# Patient Record
Sex: FEMALE | Race: WHITE | NOT HISPANIC OR LATINO | Employment: OTHER | URBAN - METROPOLITAN AREA
[De-identification: names, ages, dates, MRNs, and addresses within clinical notes are randomized per-mention and may not be internally consistent; named-entity substitution may affect disease eponyms.]

---

## 2017-01-04 ENCOUNTER — OFFICE VISIT (OUTPATIENT)
Dept: OPHTHALMOLOGY | Facility: CLINIC | Age: 72
End: 2017-01-04
Attending: OPHTHALMOLOGY
Payer: MEDICARE

## 2017-01-04 DIAGNOSIS — H50.05 ALTERNATING ESOTROPIA: Primary | ICD-10-CM

## 2017-01-04 DIAGNOSIS — H50.21 HYPERTROPIA OF RIGHT EYE: ICD-10-CM

## 2017-01-04 PROCEDURE — 99213 OFFICE O/P EST LOW 20 MIN: CPT | Mod: ZF

## 2017-01-04 ASSESSMENT — VISUAL ACUITY
OD_CC: 20/20
CORRECTION_TYPE: GLASSES
OS_CC: 20/25
OS_CC+: -1
METHOD: SNELLEN - LINEAR

## 2017-01-04 ASSESSMENT — TONOMETRY
OS_IOP_MMHG: 14
IOP_METHOD: APPLANATION
OD_IOP_MMHG: 12

## 2017-01-04 ASSESSMENT — CONF VISUAL FIELD
OD_NORMAL: 1
OS_NORMAL: 1
METHOD: COUNTING FINGERS

## 2017-01-04 ASSESSMENT — REFRACTION_WEARINGRX
OS_SPHERE: -0.75
OD_AXIS: 176
OD_ADD: +2.25
OD_CYLINDER: +1.00
OS_AXIS: 005
OS_CYLINDER: +0.75
SPECS_TYPE: PAL
OD_SPHERE: -1.75
OS_ADD: +2.25

## 2017-01-04 ASSESSMENT — SLIT LAMP EXAM - LIDS
COMMENTS: NORMAL
COMMENTS: NORMAL

## 2017-01-04 NOTE — NURSING NOTE
Chief Complaints and History of Present Illnesses   Patient presents with     Post Op (Ophthalmology) Both Eyes     POW#12 s/p Left medial rectus recession of 4.5 mm on fixed suture and Right medial rectus recession of 4.5 mm on adjustable suture     HPI    Affected eye(s):  Both   Symptoms:     Blurred vision (Comment: feels overall vision is good, some blurriness once in a while)   Difficulty with driving (Comment: patient notes that she feels when she is pulling into her driveway, she feels that she veers to the left )   No double vision   No floaters   No flashes   No tearing   No burning            Comments:  Patient notes she has a HA right now, feels it is a sinus infection    Patient denies diplopia since last visit    Marlee Alegria January 4, 2017 12:55 PM

## 2017-01-04 NOTE — PROGRESS NOTES
ASSESSMENT AND PLAN:         1. Esotropia status post strabismus surgery:    - For a more thorough description of the disease presentation, please see my letter from the patient's initial visit with me.    - On 10/10/16 Mrs. Calle underwent bilateral medial rectus recession with use of adjustable sutures.    - Today she is happy about the surgery with no double vision or new concerns.    - Examination today showed a small angle exophoria with good control.  - Overall Mrs. Calle has done exceptionally well following strabismus surgery    - She will continue to follow with Dr. Carvajal for her macular degeneration.      I did not make a follow-up appointment, but I would be happy to see the patient back in the future should any new neuro-ophthalmic or strabismus concern arise.         Complete documentation of historical and exam elements from today's encounter can be found in the full encounter summary report (not reduplicated in this progress note).  I personally obtained the chief complaint(s) and history of present illness.  I confirmed and edited as necessary the review of systems, past medical/surgical history, family history, social history, and examination findings as documented by others; and I examined the patient myself.  I personally reviewed the relevant tests, images, and reports as documented above.  I formulated and edited as necessary the assessment and plan and discussed the findings and management plan with the patient and family   Rubio Martinez MD  1:33 PM  1/4/2017

## 2017-06-21 ENCOUNTER — OFFICE VISIT (OUTPATIENT)
Dept: OPHTHALMOLOGY | Facility: CLINIC | Age: 72
End: 2017-06-21
Attending: OPHTHALMOLOGY
Payer: MEDICARE

## 2017-06-21 DIAGNOSIS — H35.3130 NONEXUDATIVE AGE-RELATED MACULAR DEGENERATION, BILATERAL, STAGE UNSPECIFIED: Primary | ICD-10-CM

## 2017-06-21 DIAGNOSIS — H25.12 SENILE NUCLEAR SCLEROSIS, LEFT: ICD-10-CM

## 2017-06-21 DIAGNOSIS — Z96.1 PSEUDOPHAKIA: ICD-10-CM

## 2017-06-21 DIAGNOSIS — H50.05 ALTERNATING ESOTROPIA: ICD-10-CM

## 2017-06-21 PROCEDURE — 99213 OFFICE O/P EST LOW 20 MIN: CPT | Mod: 25,ZF

## 2017-06-21 PROCEDURE — 92134 CPTRZ OPH DX IMG PST SGM RTA: CPT | Mod: ZF | Performed by: OPHTHALMOLOGY

## 2017-06-21 ASSESSMENT — VISUAL ACUITY
OS_CC+: -2
OD_CC+: +2
OD_CC: 20/25
CORRECTION_TYPE: GLASSES
OS_CC: 20/25
METHOD: SNELLEN - LINEAR

## 2017-06-21 ASSESSMENT — REFRACTION_WEARINGRX
OS_SPHERE: -0.75
OD_CYLINDER: +1.00
OD_AXIS: 176
OD_SPHERE: -1.75
SPECS_TYPE: PAL
OS_CYLINDER: +0.75
OD_ADD: +2.25
OS_ADD: +2.25
OS_AXIS: 005

## 2017-06-21 ASSESSMENT — SLIT LAMP EXAM - LIDS: COMMENTS: MGD

## 2017-06-21 ASSESSMENT — TONOMETRY
IOP_METHOD: TONOPEN
OS_IOP_MMHG: 14
OD_IOP_MMHG: 14

## 2017-06-21 ASSESSMENT — CONF VISUAL FIELD
OD_NORMAL: 1
OS_NORMAL: 1

## 2017-06-21 ASSESSMENT — CUP TO DISC RATIO
OS_RATIO: 0.5
OD_RATIO: 0.4

## 2017-06-21 NOTE — PROGRESS NOTES
I have confirmed the patient's and reviewed Past Medical History, Past Surgical History, Social History, Family History, Problem List, Medication List and agree with Tech note.    CC: blurry vision    HPI:    72 year old female here for f/u dry macular degeneration.  S/p strabismus surgery with Dr. Martinez, happy  Vision seems stable since last visit here, maybe slightly worse since her strab surgery, but diplopia resolved    POHx:  CE/IOL OD 11/2014  S/p YAG OD     Amsler Grid:  No metamorphopsia OD or OS      Assessment and Plan    1. Dry Macular Degeneration, OU   - OCT today- many small - intermediate drusen both eyes, no intraretinal or SRF, however pseudo-drusen both eyes noted. Stable OU   - FAF 12/19/16:  Regular right eye and hyper-autofluorescence OS and pseudodrusen ou noted    - AREDS Category 1/2    - not taking AREDs currently     - Recommend Amsler monitoring    2. Pseudophakia, OD   - S/p YAG, looks good    3. Nuclear Sclerosis, OS   - Observe for now and will refer back to Dr. Acevedo when visually significant.    4. History of XT s/p strabismus repair with Dr. Martinez    - doing well    Retina 6 months with OCT and FAF OU     Jon Valentin MD MPH   Ophthalmology Resident PGY-3    ATTESTATION:  I have seen and examined the patient with Dr. Valentin and agree with the findings in this note, as well as the interpretations of the diagnostic tests.    Jocelyn Hayden MD PhD.  Professor & Chair

## 2017-06-21 NOTE — NURSING NOTE
Chief Complaints and History of Present Illnesses   Patient presents with     Follow Up For     Dry Macular Degeneration, OU     HPI    Affected eye(s):  Both   Symptoms:     No blurred vision   No decreased vision   No double vision   No floaters   No flashes         Do you have eye pain now?:  No      Comments:  Six month follow up for Dry Macular Degeneration, OU.  The patient notes that she is not having diplopia since her strabismus surgery.  She notes her visual acuity is stable since her last visit.  JACQUELIN Carver 7:21 AM 06/21/2017

## 2017-06-21 NOTE — MR AVS SNAPSHOT
After Visit Summary   6/21/2017    Jena Calle    MRN: [id number]           Patient Information     Date Of Birth          1945        Visit Information        Provider Department      6/21/2017 7:30 AM [facility]oc[facility] Alex [facility]        Today's Diagnoses     Nonexudative age-related macular degeneration, bilateral, stage unspecified    -  1    Pseudophakia - Right Eye        Senile nuclear sclerosis, left        Alternating esotropia - Both Eyes           Follow-ups after your visit        Follow-up notes from your care team     Return in about 6 months (around 12/21/2017) for OCT mac OU, FAF, AMD.      Future tests that were ordered for you today     Open Future Orders        Priority Expected Expires Ordered    OCT Retina Spectralis OU (both eyes) Routine  12/23/2017 6/21/2017    Fundus Autofluorescence Image (FAF) OU (both eyes) Routine  12/23/2017 6/21/2017            Who to contact     Please call your clinic at [phone] to:    Ask questions about your health    Make or cancel appointments    Discuss your medicines    Learn about your test results    Speak to your doctor   If you have compliments or concerns about an experience at your clinic, or if you wish to file a complaint, please contact [facility] Patient Relations at [phone] or email us at [email or web address]         Additional Information About Your Visit        MyChart Information     [employer] gives you secure access to your electronic health record. If you see a primary care provider, you can also send messages to your care team and make appointments. If you have questions, please call your primary care clinic.  If you do not have a primary care provider, please call [phone] and they will assist you.      [employer] is an electronic gateway that provides easy, online access to your medical records. With [employer], you can request a clinic appointment, read  your test results, renew a prescription or communicate with your care team.     To access your existing account, please contact your [facility] or call [phone] for assistance.        Care EveryWhere ID     This is your Care EveryWhere ID. This could be used by other organizations to access your [place] medical records  [id number]        Your Vitals Were     Last Period                   (LMP Unknown)            Blood Pressure from Last 3 Encounters:   10/10/16 128/64    Weight from Last 3 Encounters:   10/10/16 91.6 kg (202 lb)              We Performed the Following     OCT Retina Spectralis OU (both eyes)          Today's Medication Changes          These changes are accurate as of: 6/21/17  8:10 AM.  If you have any questions, ask your nurse or doctor.               Stop taking these medicines if you haven't already. Please contact your care team if you have questions.     ibuprofen 600 MG tablet   Commonly known as:  ADVIL/MOTRIN   Stopped by:  Jocelyn Alex MD                    Primary Care Provider Office Phone # Fax     Jeannette BOYD Courtneyguicho [phone]       [place] [id number]        Equal Access to Services     [facility]: Hadii eliza mohan Soderrick, waaxda lukimberlyadaha, qaybta kaalmada dago, michael howard . So [facility] [phone].    ATENCIÓN: Si habla español, tiene a solis disposición servicios gratuitos de asistencia lingüística. Llame al [phone].    We comply with applicable federal civil rights laws and [place] laws. We do not discriminate on the basis of race, color, national origin, age, disability sex, sexual orientation or gender identity.            Thank you!     Thank you for choosing EYE CLINIC  for your care. Our goal is always to provide you with excellent care. Hearing back from our patients is one way we can continue to improve our services.  Please take a few minutes to complete the written survey that you may receive in the mail after your visit with us. Thank you!             Your Updated Medication List - Protect others around you: Learn how to safely use, store and throw away your medicines at www.disposemymeds.org.          This list is accurate as of: 6/21/17  8:10 AM.  Always use your most recent med list.                   Brand Name Dispense Instructions for use Diagnosis    amphetamine-dextroamphetamine 20 MG per 24 hr capsule    ADDERALL XR     Take 20 mg by mouth daily        ascorbic acid 500 MG Cpcr CR capsule    vitamin C     Take 500 mg by mouth daily        aspirin 81 MG tablet      Take by mouth daily        Cholecalciferol 66672 UNITS Caps      Take by mouth 2 times daily        diltiazem 240 MG 24 hr capsule      Take by mouth every evening        FLUoxetine 40 MG capsule    PROzac     Take 80 mg by mouth every morning        FORTEO SC           lamoTRIgine 200 MG tablet    LaMICtal     Take 200 mg by mouth daily Takes 200 mg in am and `100mg in pm        levothyroxine 125 MCG tablet    SYNTHROID/LEVOTHROID     Take by mouth daily        mirtazapine 30 MG tablet    REMERON     Take 30 mg by mouth At Bedtime        MULTIVITAMINS PO           NEW MED      Inject 600 mcg as directed daily Foreto        pantoprazole 40 MG EC tablet    PROTONIX     Take by mouth every morning        prednisoLONE acetate 1 % ophthalmic susp    PRED FORTE    10 mL    Place 1 drop into both eyes 4 times daily    Postoperative eye state       ranitidine 300 MG tablet    ZANTAC     Take by mouth At Bedtime        TYLENOL PO      Take by mouth daily

## 2018-03-12 ENCOUNTER — OFFICE VISIT (OUTPATIENT)
Dept: OPHTHALMOLOGY | Facility: CLINIC | Age: 73
End: 2018-03-12
Attending: OPHTHALMOLOGY
Payer: MEDICARE

## 2018-03-12 DIAGNOSIS — H25.12 SENILE NUCLEAR SCLEROSIS, LEFT: ICD-10-CM

## 2018-03-12 DIAGNOSIS — H35.3130 NONEXUDATIVE AGE-RELATED MACULAR DEGENERATION, BILATERAL, STAGE UNSPECIFIED: Primary | ICD-10-CM

## 2018-03-12 DIAGNOSIS — Z96.1 PSEUDOPHAKIA: ICD-10-CM

## 2018-03-12 PROCEDURE — 92250 FUNDUS PHOTOGRAPHY W/I&R: CPT | Mod: ZF | Performed by: OPHTHALMOLOGY

## 2018-03-12 PROCEDURE — 92134 CPTRZ OPH DX IMG PST SGM RTA: CPT | Mod: ZF | Performed by: OPHTHALMOLOGY

## 2018-03-12 PROCEDURE — G0463 HOSPITAL OUTPT CLINIC VISIT: HCPCS | Mod: ZF

## 2018-03-12 ASSESSMENT — VISUAL ACUITY
CORRECTION_TYPE: GLASSES
OD_CC: 20/25
OD_CC: J1
OS_CC: 20/30
OS_CC: J2
OS_CC+: -1/+1
METHOD: SNELLEN - LINEAR

## 2018-03-12 ASSESSMENT — TONOMETRY
OD_IOP_MMHG: 13
IOP_METHOD: TONOPEN
OS_IOP_MMHG: 15

## 2018-03-12 ASSESSMENT — SLIT LAMP EXAM - LIDS: COMMENTS: MGD

## 2018-03-12 ASSESSMENT — CUP TO DISC RATIO
OS_RATIO: 0.5
OD_RATIO: 0.4

## 2018-03-12 ASSESSMENT — CONF VISUAL FIELD
OS_NORMAL: 1
OD_NORMAL: 1

## 2018-03-12 NOTE — NURSING NOTE
Chief Complaints and History of Present Illnesses   Patient presents with     Macular Degeneration Follow Up     HPI    Affected eye(s):  Both   Symptoms:     Decreased vision   No floaters   No flashes   Itching      Duration:  9 months   Frequency:  Constant       Do you have eye pain now?:  No      Comments:    Chief Complaints and History of Present Illnesses   Patient presents with     Macular Degeneration Follow Up     HPI    Affected eye(s):  Both   Symptoms:     Decreased vision   No floaters   No flashes   Itching      Duration:  9 months   Frequency:  Constant       Do you have eye pain now?:  No      Comments:  Pt thinks overall VA has diminished since last visit.  Pt has been unable to make the 6 month appointment due to family medical issues.  Has not been monitoring with [employer].  Noting problems reading price tags and ingredients on food labels.    Ava Hayes COT 1:36 PM 03/12/2018

## 2018-03-12 NOTE — MR AVS SNAPSHOT
After Visit Summary   3/12/2018    Jena Calle    MRN: [id number]           Patient Information     Date Of Birth          1945        Visit Information        Provider Department      3/12/2018 1:15 PM Jocelyn Alex [facility]        Today's Diagnoses     Nonexudative age-related macular degeneration, bilateral, stage unspecified    -  1    Pseudophakia - Right Eye        Senile nuclear sclerosis, left           Follow-ups after your visit        Follow-up notes from your care team     Return in about 4 months (around 7/12/2018) for Refraction, AMD, Cataract, Exam & OCT OU.      Who to contact     Please call your clinic at [phone] to:    Ask questions about your health    Make or cancel appointments    Discuss your medicines    Learn about your test results    Speak to your doctor            Additional Information About Your Visit        MyChart Information     [employer] gives you secure access to your electronic health record. If you see a primary care provider, you can also send messages to your care team and make appointments. If you have questions, please call your primary care clinic.  If you do not have a primary care provider, please call [phone] and they will assist you.      [employer] is an electronic gateway that provides easy, online access to your medical records. With [employer], you can request a clinic appointment, read your test results, renew a prescription or communicate with your care team.     To access your existing account, please contact your [facility] or call [phone] for assistance.        Care EveryWhere ID     This is your Care EveryWhere ID. This could be used by other organizations to access your [place] medical records  [id number]        Your Vitals Were     Last Period                   (LMP Unknown)            Blood Pressure from Last 3 Encounters:   10/10/16 128/64    Weight from Last 3  Encounters:   10/10/16 91.6 kg (202 lb)              We Performed the Following     Fundus Autofluorescence Image (FAF) OU (both eyes)     OCT Retina Spectralis OU (both eyes)        Primary Care Provider Office Phone # Fax #    Jeannette Galan [phone]       [facility] AND [address] [address]        Equal Access to Services     [facility] : Hadii aad ku hadasho Soomaali, waaxda luqadaha, qaybta kaalmada adeegyada, waxay idiin hayaan adeeg kharash la'aan ah. So [facility] [phone].    ATENCIÓN: Si habla español, tiene a solis disposición servicios gratuitos de asistencia lingüística. Rosaame al [phone].    We comply with applicable federal civil rights laws and [place] laws. We do not discriminate on the basis of race, color, national origin, age, disability, sex, sexual orientation, or gender identity.            Thank you!     Thank you for choosing EYE CLINIC  for your care. Our goal is always to provide you with excellent care. Hearing back from our patients is one way we can continue to improve our services. Please take a few minutes to complete the written survey that you may receive in the mail after your visit with us. Thank you!             Your Updated Medication List - Protect others around you: Learn how to safely use, store and throw away your medicines at www.disposemymeds.org.          This list is accurate as of 3/12/18  2:06 PM.  Always use your most recent med list.                   Brand Name Dispense Instructions for use Diagnosis    amphetamine-dextroamphetamine 20 MG per 24 hr capsule    ADDERALL XR     Take 20 mg by mouth daily        ascorbic acid 500 MG Cpcr CR capsule    vitamin C     Take 500 mg by mouth daily        aspirin 81 MG tablet      Take by mouth daily        Cholecalciferol 94840 UNITS Caps      Take by mouth 2 times daily        diltiazem 240 MG 24 hr capsule      Take by mouth every evening        FLUoxetine 40 MG capsule     PROzac     Take 80 mg by mouth every morning        FORTEO SC           lamoTRIgine 200 MG tablet    LaMICtal     Take 200 mg by mouth daily Takes 200 mg in am and `100mg in pm        levothyroxine 125 MCG tablet    SYNTHROID/LEVOTHROID     Take by mouth daily        mirtazapine 30 MG tablet    REMERON     Take 30 mg by mouth At Bedtime        MULTIVITAMINS PO           NEW MED      Inject 600 mcg as directed daily Foreto        pantoprazole 40 MG EC tablet    PROTONIX     Take by mouth every morning        prednisoLONE acetate 1 % ophthalmic susp    PRED FORTE    10 mL    Place 1 drop into both eyes 4 times daily    Postoperative eye state       ranitidine 300 MG tablet    ZANTAC     Take by mouth At Bedtime        TYLENOL PO      Take by mouth daily

## 2018-03-12 NOTE — PROGRESS NOTES
I have confirmed the patient's and reviewed Past Medical History, Past Surgical History, Social History, Family History, Problem List, Medication List and agree with Tech note.    CC: blurry vision both eyes     HPI: Subjective decrease both eyes per patient.    72 year old female here for f/u dry macular degeneration.  S/p strabismus surgery with Dr. Martinez, happy  Vision seems stable since last visit here, maybe slightly worse since her strab surgery, but diplopia resolved    POHx:  CE/IOL OD 11/2014  S/p YAG OD     Amsler Grid:  No metamorphopsia OD or OS      Assessment and Plan    1. Dry Macular Degeneration, OU   - OCT today- many small - intermediate drusen both eyes, no intraretinal or SRF, however pseudo-drusen both eyes noted.    - Stable OU, there is a sub-foveal deposit both eyes    - FAF 12/19/16 and today:  Regular right eye and hyper-autofluorescence OS and pseudodrusen ou noted    - AREDS Category 1/2    - not taking AREDs currently     - Recommend Amsler monitoring    2. Pseudophakia, OD   - S/p YAG, looks good    3. Nuclear Sclerosis, OS   - Observe for now and will refer back to Dr. Acevedo when visually significant.    4. History of XT s/p strabismus repair with Dr. Martinez    - doing well    Retina 4 months with refraction and  OCT ou     Jocelyn Hayden MD PhD.  Professor & Chair

## 2018-08-02 ENCOUNTER — OFFICE VISIT (OUTPATIENT)
Dept: OPHTHALMOLOGY | Facility: CLINIC | Age: 73
End: 2018-08-02
Attending: OPHTHALMOLOGY
Payer: MEDICARE

## 2018-08-02 DIAGNOSIS — H35.3230 EXUDATIVE AGE-RELATED MACULAR DEGENERATION, BILATERAL, STAGE UNSPECIFIED (H): ICD-10-CM

## 2018-08-02 DIAGNOSIS — H51.9 CONVERGENCE INSUFFICIENCY OR PALSY IN BINOCULAR EYE MOVEMENT: Primary | ICD-10-CM

## 2018-08-02 PROCEDURE — 92015 DETERMINE REFRACTIVE STATE: CPT | Mod: ZF

## 2018-08-02 PROCEDURE — 92134 CPTRZ OPH DX IMG PST SGM RTA: CPT | Mod: ZF | Performed by: OPHTHALMOLOGY

## 2018-08-02 PROCEDURE — G0463 HOSPITAL OUTPT CLINIC VISIT: HCPCS | Mod: ZF

## 2018-08-02 PROCEDURE — 92060 SENSORIMOTOR EXAMINATION: CPT | Mod: ZF | Performed by: OPHTHALMOLOGY

## 2018-08-02 ASSESSMENT — REFRACTION_WEARINGRX
OS_AXIS: 005
OS_ADD: +2.25
OD_AXIS: 176
OS_SPHERE: -0.75
SPECS_TYPE: PAL
OS_CYLINDER: +0.75
OD_ADD: +2.25
OD_SPHERE: -1.75
OD_CYLINDER: +1.00

## 2018-08-02 ASSESSMENT — VISUAL ACUITY
OS_CC: 20/30
OD_CC: 20/25
OS_CC+: -2
OD_CC+: -2
CORRECTION_TYPE: GLASSES
OS_PH_CC: 20/25-2
METHOD: SNELLEN - LINEAR

## 2018-08-02 ASSESSMENT — TONOMETRY
OD_IOP_MMHG: 10
IOP_METHOD: ICARE
OS_IOP_MMHG: 10

## 2018-08-02 ASSESSMENT — CONF VISUAL FIELD
OD_NORMAL: 1
OS_NORMAL: 1

## 2018-08-02 ASSESSMENT — REFRACTION_MANIFEST
OD_CYLINDER: +0.75
OD_AXIS: 172
OD_ADD: +2.75
OS_ADD: +2.75
OS_CYLINDER: +1.25
OS_SPHERE: -1.25
OS_AXIS: 025
OD_SPHERE: -1.50

## 2018-08-02 ASSESSMENT — CUP TO DISC RATIO
OS_RATIO: 0.5
OD_RATIO: 0.4

## 2018-08-02 ASSESSMENT — SLIT LAMP EXAM - LIDS: COMMENTS: MGD

## 2018-08-02 NOTE — PROGRESS NOTES
I have confirmed the patient's and reviewed Past Medical History, Past Surgical History, Social History, Family History, Problem List, Medication List and agree with Tech note.    CC: mild blurry vision both eyes     HPI: Subjective no great decrease     73 year old female here for f/u dry macular degeneration.  S/p strabismus surgery with Dr. Martinez, happy  Vision seems stable since last visit here, maybe slightly worse since her strab surgery, but diplopia resolved    POHx:  CE/IOL OD 11/2014  S/p YAG OD     Amsler Grid:  No metamorphopsia OD or OS      Assessment and Plan    1. Dry Macular Degeneration, OU   - OCT today- many small - intermediate drusen both eyes, no intraretinal or SRF, however pseudo-drusen both eyes noted.    - Stable OU, there is a sub-foveal deposit both eyes    - FAF 12/19/16 and today:  Regular right eye and hyper-autofluorescence OS and pseudodrusen ou noted    - AREDS Category 1/2    - not taking AREDs currently     - Recommend Amsler monitoring    2. Pseudophakia, OD   - S/p YAG, looks good    3. Nuclear Sclerosis, OS   - Observe for now and will refer back to Dr. Acevedo when visually significant.    4. History of XT s/p strabismus repair with Dr. Martinez    - doing well    Retina 6 months or as needed with exam and  OCT ou     Jocelyn Hayden MD PhD.  Professor & Chair

## 2018-08-02 NOTE — NURSING NOTE
Chief Complaints and History of Present Illnesses   Patient presents with     Follow Up For     Dry Macular Degeneration, OU     HPI    Affected eye(s):  Both   Symptoms:        Duration:  6 months   Frequency:  Constant       Do you have eye pain now?:  No      Comments:  Pt. States that VA seems to be worsening BE.  Reading is becoming a struggle.  Has been seeing one floater-cant tell which eye is coming from.  Jada LITTLE 8:06 AM August 2, 2018

## 2018-08-02 NOTE — MR AVS SNAPSHOT
After Visit Summary   8/2/2018    Jena Calle    MRN: [id number]           Patient Information     Date Of Birth          1945        Visit Information        Provider Department      8/2/2018 7:45 AM Jocelyn Alex MD [facility]        Today's Diagnoses     Convergence insufficiency or palsy in binocular eye movement    -  1    Exudative age-related macular degeneration, bilateral, stage unspecified (H)           Follow-ups after your visit        Follow-up notes from your care team     Return in about 6 months (around 2/2/2019) for AMD, Exam & OCT OU, FAF OU.      Future tests that were ordered for you today     Open Future Orders        Priority Expected Expires Ordered    OCT Retina Spectralis OU (both eyes) Routine  2/3/2020 8/2/2018    Fundus Autofluorescence Image (FAF) OU (both eyes) Routine  2/3/2020 8/2/2018            Who to contact     Please call your clinic at [phone] to:    Ask questions about your health    Make or cancel appointments    Discuss your medicines    Learn about your test results    Speak to your doctor            Additional Information About Your Visit        [employer] Information     [email or web address] gives you secure access to your electronic health record. If you see a primary care provider, you can also send messages to your care team and make appointments. If you have questions, please call your primary care clinic.  If you do not have a primary care provider, please call [phone] and they will assist you.      [email or web address] is an electronic gateway that provides easy, online access to your medical records. With [email or web address], you can request a clinic appointment, read your test results, renew a prescription or communicate with your care team.     To access your existing account, please contact your [facility] or call [phone] for assistance.        Care EveryWhere ID     This is your Care EveryWhere ID. This could be  used by other organizations to access your [place] medical records  [id number]        Your Vitals Were     Last Period                   (LMP Unknown)            Blood Pressure from Last 3 Encounters:   10/10/16 128/64    Weight from Last 3 Encounters:   10/10/16 91.6 kg (202 lb)              We Performed the Following     OCT Retina Spectralis OU (both eyes)     Sensorimotor        Primary Care Provider Office Phone # Fax #    Jeannette Galan [phone]       [facility] AND [address] [address]        Equal Access to Services     [facility]: Hadii aad ku hadasho Soomaali, waaxda luqadaha, qaybta kaalmada adeegyada, waxay fareed haymarichuy howard . So [facility] [phone].    ATENCIÓN: Si habla español, tiene a solis disposición servicios gratuitos de asistencia lingüística. [facility] [phone].    We comply with applicable federal civil rights laws and [place] laws. We do not discriminate on the basis of race, color, national origin, age, disability, sex, sexual orientation, or gender identity.            Thank you!     Thank you for choosing [facility]YE CLINIC  for your care. Our goal is always to provide you with excellent care. Hearing back from our patients is one way we can continue to improve our services. Please take a few minutes to complete the written survey that you may receive in the mail after your visit with us. Thank you!             Your Updated Medication List - Protect others around you: Learn how to safely use, store and throw away your medicines at www.disposemymeds.org.          This list is accurate as of 8/2/18  9:03 AM.  Always use your most recent med list.                   Brand Name Dispense Instructions for use Diagnosis    amphetamine-dextroamphetamine 20 MG per 24 hr capsule    ADDERALL XR     Take 20 mg by mouth daily        ascorbic acid 500 MG Cpcr CR capsule    vitamin C     Take 500 mg by mouth daily        aspirin 81 MG tablet       Take by mouth daily        Cholecalciferol 12347 units Caps      Take by mouth 2 times daily        diltiazem 240 MG 24 hr capsule      Take by mouth every evening        FLUoxetine 40 MG capsule    PROzac     Take 80 mg by mouth every morning        FORTEO SC           lamoTRIgine 200 MG tablet    LaMICtal     Take 200 mg by mouth daily Takes 200 mg in am and `100mg in pm        levothyroxine 125 MCG tablet    SYNTHROID/LEVOTHROID     Take by mouth daily        mirtazapine 30 MG tablet    REMERON     Take 30 mg by mouth At Bedtime        MULTIVITAMINS PO           NEW MED      Inject 600 mcg as directed daily Foreto        pantoprazole 40 MG EC tablet    PROTONIX     Take by mouth every morning        prednisoLONE acetate 1 % ophthalmic susp    PRED FORTE    10 mL    Place 1 drop into both eyes 4 times daily    Postoperative eye state       ranitidine 300 MG tablet    ZANTAC     Take by mouth At Bedtime        TYLENOL PO      Take by mouth daily

## 2018-08-02 NOTE — NURSING NOTE
Chief Complaints and History of Present Illnesses   Patient presents with     Follow Up For     Dry Macular Degeneration, OU     HPI    Affected eye(s):  Both   Symptoms:        Duration:  6 months   Frequency:  Constant       Do you have eye pain now?:  No      Comments:  Pt. States that VA seems to be worsening BE.  Reading is becoming a struggle.  Has been seeing one floater-cant tell which eye is coming from.  Jada Thomas COT 8:06 AM August 2, 2018     No complaints of diplopia. However, feels that the left eye drifts out occasionally, and most noticeable when looking into a mirror.     Hai Lehman CO 8:18 AM August 2, 2018

## 2019-04-29 ENCOUNTER — TELEPHONE (OUTPATIENT)
Dept: OPHTHALMOLOGY | Facility: CLINIC | Age: 74
End: 2019-04-29

## 2019-04-29 NOTE — TELEPHONE ENCOUNTER
[facility] Call Center    Phone Message    May a detailed message be left on voicemail: yes    Reason for Call: Other: Pt is scheduled for her retina FU but would like to know if we can also schedule a tech visit at the same time to check her eyes for new glasses Rx and to assist with installing her Prism.  Please contact Pt to confirm our intentions for the day.     Action Taken: Message routed to:  [facility] ([facility] eye clinic

## 2019-04-29 NOTE — TELEPHONE ENCOUNTER
I called and left a message for the patient.  I told her we can get her set up for glasses at her appointment but we do not always have an Orthtoptist on Wednesday. If she would like to reschedule with Dr. Alex on a Monday then we could schedule an Orthoptist appointment prior to the visit.  I gave her the triage phone number and I mentioned [facility] as a way to communicate as well.

## 2019-05-10 NOTE — TELEPHONE ENCOUNTER
Patient stated her vision is getting bad and interested in cataract surgery possibly but wondering if she should get new glasses.  I stated if she is planning to pursue cataract surgery then would hold off on new glasses.  She can discuss with Dr. Alex at her next visit about whether or not he feels her vision is decreased due to cataracts and not something else, and if cataracts would hold off on glasses.      Michelle Bertrand on 5/10/2019 at 3:36 PM

## 2019-05-15 ENCOUNTER — OFFICE VISIT (OUTPATIENT)
Dept: OPHTHALMOLOGY | Facility: CLINIC | Age: 74
End: 2019-05-15
Attending: OPHTHALMOLOGY
Payer: MEDICARE

## 2019-05-15 DIAGNOSIS — H35.3232 EXUDATIVE AGE-RELATED MACULAR DEGENERATION OF BOTH EYES WITH INACTIVE CHOROIDAL NEOVASCULARIZATION (H): ICD-10-CM

## 2019-05-15 PROCEDURE — G0463 HOSPITAL OUTPT CLINIC VISIT: HCPCS | Mod: ZF

## 2019-05-15 PROCEDURE — 92250 FUNDUS PHOTOGRAPHY W/I&R: CPT | Mod: ZF | Performed by: OPHTHALMOLOGY

## 2019-05-15 PROCEDURE — 92134 CPTRZ OPH DX IMG PST SGM RTA: CPT | Mod: ZF | Performed by: OPHTHALMOLOGY

## 2019-05-15 ASSESSMENT — VISUAL ACUITY
METHOD: SNELLEN - LINEAR
OS_CC+: -1
OD_CC+: -1
OD_CC: 20/40
OS_CC+: -2
OS_CC: 20/40
CORRECTION_TYPE: GLASSES
METHOD: SNELLEN - LINEAR
OD_CC: 20/60
OS_CC: 20/60

## 2019-05-15 ASSESSMENT — REFRACTION_WEARINGRX
OD_ADD: +2.25
OS_AXIS: 005
OS_CYLINDER: +0.75
OD_SPHERE: -1.75
OS_ADD: +2.25
OD_CYLINDER: +1.00
OS_SPHERE: -0.75
OD_AXIS: 176
SPECS_TYPE: PAL

## 2019-05-15 ASSESSMENT — CUP TO DISC RATIO
OS_RATIO: 0.5
OD_RATIO: 0.4

## 2019-05-15 ASSESSMENT — SLIT LAMP EXAM - LIDS: COMMENTS: MGD

## 2019-05-15 ASSESSMENT — CONF VISUAL FIELD
OD_NORMAL: 1
OS_NORMAL: 1

## 2019-05-15 ASSESSMENT — TONOMETRY
OD_IOP_MMHG: 16
IOP_METHOD: APPLANATION
OS_IOP_MMHG: 16
OS_IOP_MMHG: 22
IOP_METHOD: TONOPEN
OD_IOP_MMHG: 23

## 2019-05-15 NOTE — PROGRESS NOTES
I have confirmed the patient's and reviewed Past Medical History, Past Surgical History, Social History, Family History, Problem List, Medication List and agree with Tech note.    CC: mild blurry vision both eyes     HPI: Subjective harder to see to read     73 year old female here for f/u dry macular degeneration.  S/p strabismus surgery with Dr. Martinez, happy  Vision seems stable since last visit here, maybe slightly worse since her strab surgery, but diplopia resolved    POHx:  CE/IOL OD 11/2014  S/p YAG OD     Amsler Grid:  No metamorphopsia OD or OS      Assessment and Plan    1. Dry Macular Degeneration, OU   - OCT today- many small - intermediate drusen both eyes, no intraretinal or SRF, however pseudo-drusen both eyes noted.    - Stable OU, there is a sub-foveal deposit both eyes    - FAF 12/19/16 and today:  Regular right eye and hyper-autofluorescence OS and pseudodrusen ou noted    - AREDS Category 1/2    - not taking AREDs currently     - Recommend Amsler monitoring and Low Vision Refraction with Dr. Calle   2. Pseudophakia, OD   - S/p YAG, looks good    3. Nuclear Sclerosis, OS   - Observe for now and will refer back to Dr. Acevedo when visually significant.    4. History of XT s/p strabismus repair with Dr. Martinez    - doing well    Retina 3 months or as needed with exam and  OCT ou     Jocelyn Hayden MD PhD.  Professor & Chair

## 2019-05-15 NOTE — NURSING NOTE
"Chief Complaints and History of Present Illnesses   Patient presents with     Follow Up     9 month follow-up Dry ARMD, both eyes     Chief Complaint(s) and History of Present Illness(es)     Follow Up     Comments: 9 month follow-up Dry ARMD, both eyes              Comments     Pt feels her vision has decreased RE>LE both distance and near in the last year.  Notes she had strabismus surgery years ago and was told she could have prism put in her readers and it would help.  Complains of both eyes \"hurting, because they get tired easy\" x years.  Complains of glare/decreased night vision in both eyes x \"years\".  Denies the use of eye vitamins and amsler grid.  Denies any diplopia, flashes, pain, pressure, discharge, and tearing.  Pre-diabetic - managed with diet.    PETER Moore May 15, 2019 10:22 AM  Linda ROSALES May 15, 2019 10:47 AM                    "

## 2019-06-18 ENCOUNTER — OFFICE VISIT (OUTPATIENT)
Dept: OPTOMETRY | Facility: CLINIC | Age: 74
End: 2019-06-18
Attending: OPHTHALMOLOGY
Payer: COMMERCIAL

## 2019-06-18 DIAGNOSIS — H52.4 PRESBYOPIA: ICD-10-CM

## 2019-06-18 DIAGNOSIS — H35.30 AGE-RELATED MACULAR DEGENERATION: Primary | ICD-10-CM

## 2019-06-18 ASSESSMENT — SLIT LAMP EXAM - LIDS: COMMENTS: MGD

## 2019-06-18 ASSESSMENT — REFRACTION_MANIFEST
OS_ADD: +3.50
OS_SPHERE: -0.75
OD_AXIS: 170
OD_SPHERE: -1.75
OS_AXIS: 035
OS_CYLINDER: +1.50
OD_CYLINDER: +1.00
OD_ADD: +3.50

## 2019-06-18 ASSESSMENT — VISUAL ACUITY
OS_CC: 20/50
OD_CC: 20/50
OS_CC+: +1
CORRECTION_TYPE: GLASSES
OD_CC+: +2
METHOD: SNELLEN - LINEAR

## 2019-06-18 ASSESSMENT — REFRACTION_FINALRX
OD_HBASE: IN
OS_HBASE: IN
OD_HPRISM: 1.5
OS_HPRISM: 1.5

## 2019-06-18 ASSESSMENT — CONF VISUAL FIELD
OD_NORMAL: 1
OS_NORMAL: 1

## 2019-06-18 ASSESSMENT — TONOMETRY
IOP_METHOD: ICARE
OS_IOP_MMHG: 17
OD_IOP_MMHG: 17

## 2019-06-18 ASSESSMENT — CUP TO DISC RATIO
OS_RATIO: 0.5
OD_RATIO: 0.4

## 2019-06-18 NOTE — PROGRESS NOTES
Assessment/Plan  (H35.30) Age-related macular degeneration  (primary encounter diagnosis)  Comment: Patient following with Dr. Alex in retina. Best corrected vision OD: 20/30-, OS: 20/40  Plan: Discussed findings with patient. Advised patient that refractive error only constitutes a portion of why her vision has been blurry. Patient has not been taking AREDS2 vitamins but would like to start if they could potentially help with progression. Patient was encouraged to consult with her pharmacist and PCP before beginning vitamin regimen. Patient was encouraged to return to clinic with any sudden vision changes, especially new flashes/floaters/curtain over vision.     (H52.4) Presbyopia  Plan: REFRACTION [58622]        SRx updated and released. Patient will likely do very well with updated progressive lenses, but an additional Rx for near only glasses with some prism was also dispensed after trial framing in office.       Complete documentation of historical and exam elements from today's encounter can  be found in the full encounter summary report (not reduplicated in this progress  note). I personally obtained the chief complaint(s) and history of present illness. I  confirmed and edited as necessary the review of systems, past medical/surgical  history, family history, social history, and examination findings as documented by  others; and I examined the patient myself. I personally reviewed the relevant tests,  images, and reports as documented above. I formulated and edited as necessary the  assessment and plan and discussed the findings and management plan with the  patient and family.    Richard Calle, OD

## 2019-06-18 NOTE — LETTER
6/18/2019       RE: Jena Calle  [address]  [place] [address]     Dear Colleague,    Thank you for referring your patient, Jena Calle, to the EYE CLINIC at [facility]. Please see a copy of my visit note below.    Assessment/Plan  (H35.30) Age-related macular degeneration  (primary encounter diagnosis)  Comment: Patient following with Dr. Alex in retina. Best corrected vision OD: 20/30-, OS: 20/40  Plan: Discussed findings with patient. Advised patient that refractive error only constitutes a portion of why her vision has been blurry. Patient has not been taking AREDS2 vitamins but would like to start if they could potentially help with progression. Patient was encouraged to consult with her pharmacist and PCP before beginning vitamin regimen. Patient was encouraged to return to clinic with any sudden vision changes, especially new flashes/floaters/curtain over vision.     (H52.4) Presbyopia  Plan: REFRACTION [91774]        SRx updated and released. Patient will likely do very well with updated progressive lenses, but an additional Rx for near only glasses with some prism was also dispensed after trial framing in office.       Complete documentation of historical and exam elements from today's encounter can  be found in the full encounter summary report (not reduplicated in this progress  note). I personally obtained the chief complaint(s) and history of present illness. I  confirmed and edited as necessary the review of systems, past medical/surgical  history, family history, social history, and examination findings as documented by  others; and I examined the patient myself. I personally reviewed the relevant tests,  images, and reports as documented above. I formulated and edited as necessary the  assessment and plan and discussed the findings and management plan with the  patient and family.    Richard Calle, OD

## 2019-08-15 ENCOUNTER — OFFICE VISIT (OUTPATIENT)
Dept: OPHTHALMOLOGY | Facility: CLINIC | Age: 74
End: 2019-08-15
Attending: OPHTHALMOLOGY
Payer: MEDICARE

## 2019-08-15 DIAGNOSIS — H35.3232 EXUDATIVE AGE-RELATED MACULAR DEGENERATION OF BOTH EYES WITH INACTIVE CHOROIDAL NEOVASCULARIZATION (H): ICD-10-CM

## 2019-08-15 PROCEDURE — 92134 CPTRZ OPH DX IMG PST SGM RTA: CPT | Mod: ZF | Performed by: OPHTHALMOLOGY

## 2019-08-15 PROCEDURE — G0463 HOSPITAL OUTPT CLINIC VISIT: HCPCS | Mod: ZF

## 2019-08-15 ASSESSMENT — VISUAL ACUITY
OS_PH_CC: 20/30
CORRECTION_TYPE: GLASSES
OS_CC: 20/50
OS_PH_CC+: -2
OD_CC: 20/40
OS_CC+: -2
OD_CC+: -2
METHOD: SNELLEN - LINEAR

## 2019-08-15 ASSESSMENT — CONF VISUAL FIELD
OS_NORMAL: 1
OD_NORMAL: 1

## 2019-08-15 ASSESSMENT — TONOMETRY
OD_IOP_MMHG: 11
OS_IOP_MMHG: 12
IOP_METHOD: TONOPEN

## 2019-08-15 ASSESSMENT — EXTERNAL EXAM - LEFT EYE: OS_EXAM: NORMAL

## 2019-08-15 ASSESSMENT — CUP TO DISC RATIO
OD_RATIO: 0.4
OS_RATIO: 0.5

## 2019-08-15 ASSESSMENT — SLIT LAMP EXAM - LIDS: COMMENTS: MGD

## 2019-08-15 ASSESSMENT — EXTERNAL EXAM - RIGHT EYE: OD_EXAM: NORMAL

## 2019-08-15 NOTE — NURSING NOTE
Chief Complaints and History of Present Illnesses   Patient presents with     Macular Degeneration Follow Up     Chief Complaint(s) and History of Present Illness(es)     Macular Degeneration Follow Up     Laterality: both eyes    Onset: 3 months ago              Comments     Pt. States that she is doing well.  Had new lenses made and VA is much improved.  No pain BE.  Jada Thomas COT 8:43 AM August 15, 2019

## 2019-08-15 NOTE — PROGRESS NOTES
I have confirmed the patient's and reviewed Past Medical History, Past Surgical History, Social History, Family History, Problem List, Medication List and agree with Tech note.    CC: mild blurry vision both eyes     HPI: Subjective harder to see to read     74 year old female here for f/u dry macular degeneration.  S/p strabismus surgery with Dr. Martinez, happy  Vision seems stable since last visit here, maybe slightly worse since her strab surgery, but diplopia resolved    POHx:  CE/IOL OD 11/2014  S/p YAG OD     Amsler Grid:  No metamorphopsia OD or OS      Assessment and Plan    1. Dry Macular Degeneration, OU   - OCT today- many small - intermediate drusen both eyes, no intraretinal or SRF, however pseudo-drusen both eyes noted.    - Stable OU, there is a sub-foveal deposit both eyes    - FAF 12/19/16 and today:  Regular right eye and hyper-autofluorescence OS and pseudodrusen ou noted and wonder if this is adult vitelliform both eyes and plan to repeat fundus autofluorescence next year in may    - AREDS Category 1/2    - not taking AREDs currently     - Recommend Amsler monitoring and Low Vision Refraction with Dr. Calle     2. Pseudophakia, OD   - S/p YAG, looks good    3. Nuclear Sclerosis, OS   - Observe for now and will refer back to Dr. Acevedo when visually significant.    4. History of XT s/p strabismus repair with Dr. Martinez    - doing well    Retina 3 months or as needed with exam and  OCT ou     Jocelyn Hayedn MD PhD.  Professor & Chair

## 2019-09-30 ENCOUNTER — HEALTH MAINTENANCE LETTER (OUTPATIENT)
Age: 74
End: 2019-09-30

## 2019-12-15 ENCOUNTER — HEALTH MAINTENANCE LETTER (OUTPATIENT)
Age: 74
End: 2019-12-15

## 2020-03-09 DIAGNOSIS — H35.3232 EXUDATIVE AGE-RELATED MACULAR DEGENERATION OF BOTH EYES WITH INACTIVE CHOROIDAL NEOVASCULARIZATION (H): Primary | ICD-10-CM

## 2020-03-11 ENCOUNTER — OFFICE VISIT (OUTPATIENT)
Dept: OPHTHALMOLOGY | Facility: CLINIC | Age: 75
End: 2020-03-11
Attending: OPHTHALMOLOGY
Payer: MEDICARE

## 2020-03-11 DIAGNOSIS — H35.3232 EXUDATIVE AGE-RELATED MACULAR DEGENERATION OF BOTH EYES WITH INACTIVE CHOROIDAL NEOVASCULARIZATION (H): ICD-10-CM

## 2020-03-11 PROCEDURE — G0463 HOSPITAL OUTPT CLINIC VISIT: HCPCS | Mod: ZF

## 2020-03-11 PROCEDURE — 92134 CPTRZ OPH DX IMG PST SGM RTA: CPT | Mod: ZF | Performed by: OPHTHALMOLOGY

## 2020-03-11 ASSESSMENT — VISUAL ACUITY
OS_CC: 20/70
OS_CC+: -1
OD_CC: 20/40
OS_PH_CC: 20/60
OS_PH_CC+: -2
CORRECTION_TYPE: GLASSES
METHOD: SNELLEN - LINEAR

## 2020-03-11 ASSESSMENT — REFRACTION_WEARINGRX
OD_AXIS: 176
OD_CYLINDER: +1.00
OS_ADD: +2.25
OS_CYLINDER: +0.75
OS_AXIS: 005
OD_ADD: +2.25
OS_SPHERE: -0.75
SPECS_TYPE: PAL
OD_SPHERE: -1.75

## 2020-03-11 ASSESSMENT — CONF VISUAL FIELD
OD_NORMAL: 1
OS_NORMAL: 1

## 2020-03-11 ASSESSMENT — EXTERNAL EXAM - LEFT EYE: OS_EXAM: NORMAL

## 2020-03-11 ASSESSMENT — CUP TO DISC RATIO
OS_RATIO: 0.5
OD_RATIO: 0.4

## 2020-03-11 ASSESSMENT — TONOMETRY
OD_IOP_MMHG: 14
OS_IOP_MMHG: 14
IOP_METHOD: TONOPEN

## 2020-03-11 ASSESSMENT — SLIT LAMP EXAM - LIDS: COMMENTS: MGD

## 2020-03-11 ASSESSMENT — EXTERNAL EXAM - RIGHT EYE: OD_EXAM: NORMAL

## 2020-03-11 NOTE — NURSING NOTE
Chief Complaints and History of Present Illnesses   Patient presents with     Macular Degeneration Follow Up     Chief Complaint(s) and History of Present Illness(es)     Macular Degeneration Follow Up     Laterality: both eyes              Comments     Pt. States that VA continues to worsen BE. Got new glasses made and VA did not improve much. No pain BE.  Jada Thomas COT 10:49 AM March 11, 2020

## 2020-03-11 NOTE — PROGRESS NOTES
I have confirmed the patient's and reviewed Past Medical History, Past Surgical History, Social History, Family History, Problem List, Medication List and agree with Tech note.    CC: mild blurry vision both eyes     HPI: Subjective harder to see to read     74 year old female here for f/u dry macular degeneration.  S/p strabismus surgery with Dr. Martinez.  Vision seems stable since last visit here, maybe slightly worse since her strab surgery, but diplopia resolved    POHx:  CE/IOL OD 11/2014  S/p YAG OD     Amsler Grid:  No metamorphopsia OD or OS      Assessment and Plan    1. Dry Macular Degeneration, OU   - OCT today- many small - intermediate drusen both eyes, no intraretinal or SRF, however pseudo-drusen both eyes noted.    - Stable OU, there is a sub-foveal deposit both eyes    - FAF 12/19/16 and today:  Regular right eye and hyper-autofluorescence OS and pseudodrusen ou noted and wonder if this is adult vitelliform both eyes and plan to follow with OCT and FAF    - AREDS Category 1/2    - not taking AREDs currently; recommended to take AREDS II supplements      - Recommend Amsler monitoring and Low Vision Refraction with Dr. Calle     2. Pseudophakia, OD   - S/p YAG, looks good    3. Nuclear Sclerosis, OS   - Observe for now t.    4. History of XT s/p strabismus repair with Dr. Martinez    - doing well    Retina 4 months or as needed with exam and  OCT ou       Gurdeep Goode MD  Vitreoretinal surgery fellow  [facility]    Attestation:  I have seen and examined the patient with Dr. Goode and agree with the findings in this note, as well as the interpretations of the diagnostic tests.      Jocelyn Hayden MD PhD.  Professor & Chair

## 2020-10-15 ENCOUNTER — OFFICE VISIT (OUTPATIENT)
Dept: OPHTHALMOLOGY | Facility: CLINIC | Age: 75
End: 2020-10-15
Attending: OPHTHALMOLOGY
Payer: MEDICARE

## 2020-10-15 DIAGNOSIS — H35.3131 EARLY DRY STAGE NONEXUDATIVE AGE-RELATED MACULAR DEGENERATION OF BOTH EYES: Primary | ICD-10-CM

## 2020-10-15 DIAGNOSIS — H43.812 PVD (POSTERIOR VITREOUS DETACHMENT), LEFT EYE: ICD-10-CM

## 2020-10-15 DIAGNOSIS — H35.3232 EXUDATIVE AGE-RELATED MACULAR DEGENERATION OF BOTH EYES WITH INACTIVE CHOROIDAL NEOVASCULARIZATION (H): Primary | ICD-10-CM

## 2020-10-15 DIAGNOSIS — H25.12 SENILE NUCLEAR SCLEROSIS, LEFT: ICD-10-CM

## 2020-10-15 DIAGNOSIS — H35.54 ADULT ONSET VITELLIFORM MACULAR DYSTROPHY: ICD-10-CM

## 2020-10-15 PROCEDURE — G0463 HOSPITAL OUTPT CLINIC VISIT: HCPCS

## 2020-10-15 PROCEDURE — 92012 INTRM OPH EXAM EST PATIENT: CPT | Performed by: OPHTHALMOLOGY

## 2020-10-15 ASSESSMENT — CONF VISUAL FIELD
METHOD: COUNTING FINGERS
OD_NORMAL: 1
OS_NORMAL: 1

## 2020-10-15 ASSESSMENT — VISUAL ACUITY
OD_CC: 20/40
OD_CC+: -2
CORRECTION_TYPE: GLASSES
OS_CC+: -2
OS_CC: 20/70
METHOD: SNELLEN - LINEAR

## 2020-10-15 ASSESSMENT — REFRACTION_WEARINGRX
OS_AXIS: 005
OD_SPHERE: -1.75
SPECS_TYPE: PAL
OD_ADD: +2.25
OS_ADD: +2.25
OS_SPHERE: -0.75
OD_AXIS: 176
OS_CYLINDER: +0.75
OD_CYLINDER: +1.00

## 2020-10-15 ASSESSMENT — EXTERNAL EXAM - LEFT EYE: OS_EXAM: NORMAL

## 2020-10-15 ASSESSMENT — CUP TO DISC RATIO
OS_RATIO: 0.5
OD_RATIO: 0.4

## 2020-10-15 ASSESSMENT — EXTERNAL EXAM - RIGHT EYE: OD_EXAM: NORMAL

## 2020-10-15 ASSESSMENT — SLIT LAMP EXAM - LIDS: COMMENTS: MGD

## 2020-10-15 ASSESSMENT — TONOMETRY
IOP_METHOD: TONOPEN
OD_IOP_MMHG: 15
OS_IOP_MMHG: 15

## 2020-10-15 NOTE — PROGRESS NOTES
I have confirmed the patient's and reviewed Past Medical History, Past Surgical History, Social History, Family History, Problem List, Medication List and agree with Tech note.    CC: mild blurry vision both eyes     HPI: Subjective harder to see to read     74 year old female here for f/u dry macular degeneration.  S/p strabismus surgery with Dr. Martinez.  Vision seems stable since last visit here, maybe slightly worse since her strab surgery, but diplopia resolved    POHx:  CE/IOL OD 11/2014  S/p YAG OD     Amsler Grid:  No metamorphopsia OD or OS      Assessment and Plan    1. Dry Macular Degeneration, OU   - OCT today- many small - intermediate drusen both eyes, no intraretinal or SRF, however pseudo-drusen both eyes noted.    - Stable OU, there is a sub-foveal deposit both eyes    - FAF 12/19/16 and today:  Regular right eye and hyper-autofluorescence OS and pseudodrusen ou noted and wonder if this is adult vitelliform both eyes and plan to follow with OCT and FAF    - AREDS Category 1/2    - not taking AREDs currently; recommended to take AREDS II supplements      - Recommend Amsler monitoring and Low Vision Refraction with Dr. Calle     2.  Adult vitelliform Dystrophy   - repeat fundus autofluorescence both eyes next visit     2. Pseudophakia, OD   - S/p YAG, looks good    3. Nuclear Sclerosis, OS   - Observe for now and will revisit in th spring    4. History of XT s/p strabismus repair with Dr. Martinez    - doing well    Retina 3 months or as needed with exam and  OCT ou and fundus autofluorescence both eyes             Jocelyn Hayden MD PhD.  Professor & Chair

## 2021-01-13 DIAGNOSIS — H35.3232 EXUDATIVE AGE-RELATED MACULAR DEGENERATION OF BOTH EYES WITH INACTIVE CHOROIDAL NEOVASCULARIZATION (H): Primary | ICD-10-CM

## 2021-01-15 ENCOUNTER — HEALTH MAINTENANCE LETTER (OUTPATIENT)
Age: 76
End: 2021-01-15

## 2021-02-03 ENCOUNTER — OFFICE VISIT (OUTPATIENT)
Dept: OPHTHALMOLOGY | Facility: CLINIC | Age: 76
End: 2021-02-03
Attending: OPHTHALMOLOGY
Payer: MEDICARE

## 2021-02-03 DIAGNOSIS — H35.54 ADULT ONSET VITELLIFORM MACULAR DYSTROPHY: ICD-10-CM

## 2021-02-03 DIAGNOSIS — H25.12 SENILE NUCLEAR SCLEROSIS, LEFT: ICD-10-CM

## 2021-02-03 DIAGNOSIS — H04.123 DRY EYE SYNDROME OF BOTH EYES: ICD-10-CM

## 2021-02-03 DIAGNOSIS — H35.3132 INTERMEDIATE STAGE NONEXUDATIVE AGE-RELATED MACULAR DEGENERATION OF BOTH EYES: Primary | ICD-10-CM

## 2021-02-03 PROCEDURE — 92250 FUNDUS PHOTOGRAPHY W/I&R: CPT | Performed by: OPHTHALMOLOGY

## 2021-02-03 PROCEDURE — 99207 FUNDUS AUTOFLUORESCENCE IMAGE (FAF) OU (BOTH EYES): CPT | Performed by: OPHTHALMOLOGY

## 2021-02-03 PROCEDURE — G0463 HOSPITAL OUTPT CLINIC VISIT: HCPCS

## 2021-02-03 PROCEDURE — 92014 COMPRE OPH EXAM EST PT 1/>: CPT | Performed by: OPHTHALMOLOGY

## 2021-02-03 PROCEDURE — 92134 CPTRZ OPH DX IMG PST SGM RTA: CPT | Performed by: OPHTHALMOLOGY

## 2021-02-03 ASSESSMENT — TONOMETRY
IOP_METHOD: TONOPEN
OD_IOP_MMHG: 11
OS_IOP_MMHG: 13

## 2021-02-03 ASSESSMENT — REFRACTION_WEARINGRX
OD_ADD: +2.25
OS_CYLINDER: +0.75
OD_AXIS: 176
SPECS_TYPE: PAL
OS_ADD: +2.25
OD_SPHERE: -1.75
OD_CYLINDER: +1.00
OS_AXIS: 005
OS_SPHERE: -0.75

## 2021-02-03 ASSESSMENT — CONF VISUAL FIELD
OS_NORMAL: 1
OD_NORMAL: 1
METHOD: COUNTING FINGERS

## 2021-02-03 ASSESSMENT — VISUAL ACUITY
OD_CC: 20/60+
OS_CC: 20/80+
CORRECTION_TYPE: GLASSES
METHOD: SNELLEN - LINEAR

## 2021-02-03 ASSESSMENT — EXTERNAL EXAM - RIGHT EYE: OD_EXAM: NORMAL

## 2021-02-03 ASSESSMENT — CUP TO DISC RATIO
OD_RATIO: 0.4
OS_RATIO: 0.5

## 2021-02-03 ASSESSMENT — SLIT LAMP EXAM - LIDS: COMMENTS: MGD

## 2021-02-03 ASSESSMENT — EXTERNAL EXAM - LEFT EYE: OS_EXAM: NORMAL

## 2021-02-03 NOTE — PROGRESS NOTES
CC: decreased vision left eye     HPI: Subjective harder to see to read     74 year old female here for f/u dry macular degeneration.  S/p strabismus surgery with Dr. Martinez.  Gradually worsening vision especially left eye     POHx:  CE/IOL OD 11/2014  S/p YAG OD     Amsler Grid:  No metamorphopsia OD or OS    Assessment and Plan    1. Dry Macular Degeneration, OU   - OCT - many small - intermediate drusen and pseudo-drusen both eyes.    - Stable OU, associated with sub-foveal vitelliform deposit both eyes    - AREDS Category 1/2    - c/w AREDS II supplements      - Recommend Amsler monitoring and Low Vision Refraction with Dr. Calle but since is considering left eye cat surgery, will see Dr. Calle after the procedure    2.  Adult vitelliform Dystrophy   - repeat fundus autofluorescence and OCT macula both eyes shows stable lesions   - may have a major contribution to the amount of vision; discussed    2. Pseudophakia, OD   - S/p YAG, looks good    3. Nuclear Sclerosis, OS   - may have a 25-50% contribution to the amount of vision   - may consider surgery next visit    4. History of XT s/p strabismus repair with Dr. Martinez    - doing well    RTC 2-3 months or as needed with exam and  OCT ou and DFE and IOL master ou  Discuss scheduling cat surgery    Complete documentation of historical and exam elements from today's encounter can be found in the full encounter summary report (not reduplicated in this progress note). I personally obtained the chief complaint(s) and history of present illness.  I confirmed and edited as necessary the review of systems, past medical/surgical history, family history, social history, and examination findings as documented by others; and I examined the patient myself. I personally reviewed the relevant tests, images, and reports as documented above. I formulated and edited as necessary the assessment and plan and discussed the findings and management plan with the patient and  family.    Gurdeep Goode MD

## 2021-02-04 ENCOUNTER — IMMUNIZATION (OUTPATIENT)
Dept: NURSING | Facility: CLINIC | Age: 76
End: 2021-02-04
Payer: MEDICARE

## 2021-02-04 PROCEDURE — 91300 PR COVID VAC PFIZER DIL RECON 30 MCG/0.3 ML IM: CPT

## 2021-02-04 PROCEDURE — 0001A PR COVID VAC PFIZER DIL RECON 30 MCG/0.3 ML IM: CPT

## 2021-02-25 ENCOUNTER — IMMUNIZATION (OUTPATIENT)
Dept: NURSING | Facility: CLINIC | Age: 76
End: 2021-02-25
Attending: FAMILY MEDICINE
Payer: COMMERCIAL

## 2021-02-25 PROCEDURE — 91300 PR COVID VAC PFIZER DIL RECON 30 MCG/0.3 ML IM: CPT

## 2021-02-25 PROCEDURE — 0002A PR COVID VAC PFIZER DIL RECON 30 MCG/0.3 ML IM: CPT

## 2021-05-28 ENCOUNTER — RECORDS - HEALTHEAST (OUTPATIENT)
Dept: ADMINISTRATIVE | Facility: CLINIC | Age: 76
End: 2021-05-28

## 2021-10-24 ENCOUNTER — HEALTH MAINTENANCE LETTER (OUTPATIENT)
Age: 76
End: 2021-10-24

## 2021-11-08 ENCOUNTER — TELEPHONE (OUTPATIENT)
Dept: OPHTHALMOLOGY | Facility: CLINIC | Age: 76
End: 2021-11-08
Payer: MEDICARE

## 2021-11-08 ENCOUNTER — OFFICE VISIT (OUTPATIENT)
Dept: OPHTHALMOLOGY | Facility: CLINIC | Age: 76
End: 2021-11-08
Attending: OPHTHALMOLOGY
Payer: MEDICARE

## 2021-11-08 DIAGNOSIS — H35.3132 INTERMEDIATE STAGE NONEXUDATIVE AGE-RELATED MACULAR DEGENERATION OF BOTH EYES: Primary | ICD-10-CM

## 2021-11-08 PROCEDURE — G0463 HOSPITAL OUTPT CLINIC VISIT: HCPCS

## 2021-11-08 PROCEDURE — 99213 OFFICE O/P EST LOW 20 MIN: CPT | Performed by: STUDENT IN AN ORGANIZED HEALTH CARE EDUCATION/TRAINING PROGRAM

## 2021-11-08 ASSESSMENT — REFRACTION_WEARINGRX
OD_ADD: +3.50
OD_CYLINDER: +1.00
OD_AXIS: 170
OD_SPHERE: -1.75
OS_SPHERE: -0.75
OS_ADD: +3.50
SPECS_TYPE: PAL
OS_CYLINDER: +1.50
OS_AXIS: 035

## 2021-11-08 ASSESSMENT — VISUAL ACUITY
OD_CC+: +1
OD_CC: 20/70
OS_PH_CC+: -2
METHOD: SNELLEN - LINEAR
OS_CC+: -
OD_PH_CC: 20/60
OD_PH_CC+: -2
OS_CC: 20/250
OS_PH_CC: 20/70
CORRECTION_TYPE: GLASSES

## 2021-11-08 ASSESSMENT — CONF VISUAL FIELD
OS_NORMAL: 1
METHOD: COUNTING FINGERS
OD_NORMAL: 1

## 2021-11-08 ASSESSMENT — CUP TO DISC RATIO
OD_RATIO: 0.4
OS_RATIO: 0.5

## 2021-11-08 ASSESSMENT — TONOMETRY
IOP_METHOD: TONOPEN
OS_IOP_MMHG: 14
OD_IOP_MMHG: 12

## 2021-11-08 ASSESSMENT — EXTERNAL EXAM - RIGHT EYE: OD_EXAM: NORMAL

## 2021-11-08 ASSESSMENT — SLIT LAMP EXAM - LIDS: COMMENTS: MGD

## 2021-11-08 ASSESSMENT — EXTERNAL EXAM - LEFT EYE: OS_EXAM: NORMAL

## 2021-11-08 NOTE — NURSING NOTE
Chief Complaints and History of Present Illnesses   Patient presents with     Macular Degeneration Follow Up     Chief Complaint(s) and History of Present Illness(es)     Macular Degeneration Follow Up     Laterality: both eyes    Quality: blurred vision    Frequency: constantly    Timing: throughout the day    Context: reading and near vision    Course: gradually worsening    Associated symptoms: itching.  Negative for glare, haloes, dryness, eye pain, discharge and burning    Treatments tried: artificial tears    Response to treatment: significant improvement    Pain scale: 0/10              Comments     Pt struggling with VA for distance and near. Interested in cataract surgery if still appropriate. Pt broke progressives. She has SVN prescription glasses 'which don't work anymore'. Last MR by Dr. Calle, 6/2019.   Pt states uses art tears on rare occasions.  Ava Hayes, ANABEL COT 2:53 PM 11/08/2021

## 2021-11-08 NOTE — PROGRESS NOTES
CC: decreased vision left eye     HPI: Subjective harder to see to read     74 year old female here for f/u dry macular degeneration.  S/p strabismus surgery with Dr. Martinez.  Gradually worsening vision especially left eye     POHx:  CE/IOL OD 11/2014  S/p YAG OD     Amsler Grid:  No metamorphopsia OD or left eye    OCT Macula 11/08/21  OD: diffuse reticular pseudodrusen, stable central vitelliform deposit with flattening of foveal contour, no IRF/SRF  OS: diffuse reticular pseudodrusen, central vitelliform deposit is smaller than previous with flattening of foveal contour, no IRF/SRF      Assessment and Plan    # Dry Macular Degeneration, OU   - OCT - many small - intermediate drusen and pseudo-drusen both eyes.    - Stable OU, associated with sub-foveal vitelliform deposit both eyes    - AREDS Category 1/2    - c/w AREDS II supplements      - Recommend Amsler monitoring and Low Vision Refraction with Dr. Calle but since is considering left eye cat surgery, will see Dr. Calle after the procedure    #  Adult vitelliform Dystrophy   - repeat fundus autofluorescence and OCT macula both eyes shows stable lesions   - may have a major contribution to the amount of vision; discussed    # Pseudophakia, OD   - S/p YAG, looks good    # Nuclear Sclerosis, OS   - may have a 25-50% contribution to the amount of vision   - will schedule next visit w/ Russel to discuss cataract surgery    # History of XT s/p strabismus repair with Dr. Martinez    - doing well    RTC 4 months with Russel or as needed with exam and  OCT ou and DFE and IOL master ou. Discuss scheduling cat surgery    ATTESTATION     Attending Physician Attestation:      Complete documentation of historical and exam elements from today's encounter can be found in the full encounter summary report (not reduplicated in this progress note).  I personally obtained the chief complaint(s) and history of present illness.  I confirmed and edited as necessary the  review of systems, past medical/surgical history, family history, social history, and examination findings as documented by others; and I examined the patient myself.  I personally reviewed the relevant tests, images, and reports as documented above.  I formulated and edited as necessary the assessment and plan and discussed the findings and management plan with the patient and family    Sergey Mera MD  Retina Fellow  Department of Ophthalmology  [facility]  Pager: [phone]

## 2022-02-13 ENCOUNTER — HEALTH MAINTENANCE LETTER (OUTPATIENT)
Age: 77
End: 2022-02-13

## 2022-02-24 DIAGNOSIS — H25.12 SENILE NUCLEAR SCLEROSIS, LEFT: Primary | ICD-10-CM

## 2022-03-08 ENCOUNTER — OFFICE VISIT (OUTPATIENT)
Dept: OPHTHALMOLOGY | Facility: CLINIC | Age: 77
End: 2022-03-08
Attending: OPHTHALMOLOGY
Payer: MEDICARE

## 2022-03-08 DIAGNOSIS — H35.54 ADULT ONSET VITELLIFORM MACULAR DYSTROPHY: ICD-10-CM

## 2022-03-08 DIAGNOSIS — H04.123 DRY EYE SYNDROME OF BOTH EYES: ICD-10-CM

## 2022-03-08 DIAGNOSIS — H35.3132 INTERMEDIATE STAGE NONEXUDATIVE AGE-RELATED MACULAR DEGENERATION OF BOTH EYES: ICD-10-CM

## 2022-03-08 DIAGNOSIS — H25.12 SENILE NUCLEAR SCLEROSIS, LEFT: Primary | ICD-10-CM

## 2022-03-08 PROCEDURE — 99215 OFFICE O/P EST HI 40 MIN: CPT | Performed by: OPHTHALMOLOGY

## 2022-03-08 PROCEDURE — 76519 ECHO EXAM OF EYE: CPT | Mod: 26 | Performed by: OPHTHALMOLOGY

## 2022-03-08 PROCEDURE — 76519 ECHO EXAM OF EYE: CPT | Performed by: OPHTHALMOLOGY

## 2022-03-08 PROCEDURE — G0463 HOSPITAL OUTPT CLINIC VISIT: HCPCS | Mod: 25

## 2022-03-08 ASSESSMENT — REFRACTION_MANIFEST
OS_AXIS: 035
OD_SPHERE: -2.25
OD_AXIS: 170
OD_ADD: +3.25
OS_CYLINDER: +1.50
OD_CYLINDER: +0.75
OS_SPHERE: -1.75
OS_ADD: +3.25

## 2022-03-08 ASSESSMENT — EXTERNAL EXAM - LEFT EYE: OS_EXAM: NORMAL

## 2022-03-08 ASSESSMENT — VISUAL ACUITY
OS_CC+: -2
CORRECTION_TYPE: GLASSES
OS_CC: 20/60
OD_CC+: -2
OD_CC: 20/70
METHOD: SNELLEN - LINEAR

## 2022-03-08 ASSESSMENT — REFRACTION_WEARINGRX
OD_AXIS: 170
SPECS_TYPE: PAL
OS_ADD: +3.50
OD_CYLINDER: +1.00
OD_ADD: +3.50
OS_SPHERE: -0.75
OS_CYLINDER: +1.50
OS_AXIS: 035
OD_SPHERE: -1.75

## 2022-03-08 ASSESSMENT — TONOMETRY
IOP_METHOD: TONOPEN
OD_IOP_MMHG: 13
OS_IOP_MMHG: 11

## 2022-03-08 ASSESSMENT — CONF VISUAL FIELD
OD_NORMAL: 1
METHOD: COUNTING FINGERS
OS_NORMAL: 1

## 2022-03-08 ASSESSMENT — CUP TO DISC RATIO
OS_RATIO: 0.5
OD_RATIO: 0.4

## 2022-03-08 ASSESSMENT — EXTERNAL EXAM - RIGHT EYE: OD_EXAM: NORMAL

## 2022-03-08 ASSESSMENT — SLIT LAMP EXAM - LIDS: COMMENTS: MGD

## 2022-03-08 NOTE — PROGRESS NOTES
CC: decreased vision ou     HPI: Subjective harder to see to read     76 year old female here for f/u dry macular degeneration.  S/p strabismus surgery with Dr. Martinez.  Gradually worsening vision especially left eye     POHx:  CE/IOL OD 11/2014  S/p YAG OD     Amsler Grid:  No metamorphopsia OD or left eye    OCT Macula 3/8/2022  OD: diffuse reticular pseudodrusen, stable central vitelliform deposit with flattening of foveal contour, no IRF/SRF; stable   OS: diffuse reticular pseudodrusen, central vitelliform deposit is smaller than previous with flattening of foveal contour, no IRF/SRF; stable       Assessment and Plan    # Nuclear Sclerosis, OS   - may have some contribution    - wants to have cataract surgery done and then get help from glasses (or maybe low vision aids) for reading    - not on blood thinners     - discussed that cataract may only contribute slightly for overall vision     - Risks of surgery including but not limited to infection (rare but a devastating complication that will need intraocular antibiotics injection and possibly more surgeries), risk of retinal detachment, dropped nuclear pieces or dropped intraocular lens, glaucoma may need further medications, corneal edema that may need a long course of medication or even surgery were discussed  In length with the patient. We discussed about the benefits of CE IOL surgery and its alternatives. All the questions answered. Hayley agreed and wanted to proceed.      - case request for cataract surgery placed   - 45 minute surgery; mac with topical, POW1     # Dry Macular Degeneration, OU   - OCT - many small - intermediate drusen and pseudo-drusen both eyes.    - Stable OU, associated with sub-foveal vitelliform deposit both eyes    - AREDS Category 1/2    - c/w AREDS II supplements      - Recommend Amsler monitoring and Low Vision Refraction with Dr. Calle but since is considering left eye cat surgery, will see Dr. Calle after the  procedure    #  Adult vitelliform Dystrophy   - repeat fundus autofluorescence and OCT macula both eyes shows stable lesions   - may have a major contribution to the amount of vision; discussed    # Pseudophakia, OD   - S/p YAG, looks good    # History of XT s/p strabismus repair with Dr. Martinez    - doing well    # eyelid margin lesion   - gelatinous appearing small lesion with abnormal vessels    - refer to oculo plastic     RTC for POW 1 visit    Frantz Pineda MD  Vitreoretinal Surgery Fellow  [facility]     I spent 55 minutes examining the patient, discussing the findings and treatment plan, explaining the risk, benefits, and alternatives of surgery, and documenting my findings.  Complete documentation of historical and exam elements from today's encounter can be found in the full encounter summary report (not reduplicated in this progress note). I personally obtained the chief complaint(s) and history of present illness.  I confirmed and edited as necessary the review of systems, past medical/surgical history, family history, social history, and examination findings as documented by others; and I examined the patient myself. I personally reviewed the relevant tests, images, and reports as documented above. I formulated and edited as necessary the assessment and plan and discussed the findings and management plan with the patient and family.    Gurdeep Goode MD, PhD

## 2022-03-08 NOTE — NURSING NOTE
"Chief Complaints and History of Present Illnesses   Patient presents with     Cataract     Chief Complaint(s) and History of Present Illness(es)     Cataract     Laterality: left eye    Associated symptoms: blurred vision (OS, especially since the strab surgery about 5-6 yrs ago) and a need for brighter lights.  Negative for double vision, glare, haloes and starburst    Onset: gradual    Duration: months    Frequency: constant    Context: distance vision, mid-range vision and near vision    Treatments tried: glasses and artificial tears              Comments     Referral from Dr. Mera for evaluation of cataract left eye. Hx Dry AMD each eye, Adult vitelliform dystrophy, Pseudophakia right eye.   Patient states vision is blurry each eye, worse since the strabismus surgery a few years ago. \"My glasses broke. Although the glasses didn't help much, it's been difficult to see without them.\" Patient notes an intermittent black dot in right eye. Denies flashes. Patient notes some dryness and itching each eye. Patient uses Artificial tears. Patient notes drops give her relief.     Radha Chun, COT 9:40 AM 03/08/2022                  "

## 2022-04-05 ENCOUNTER — TELEPHONE (OUTPATIENT)
Dept: OPHTHALMOLOGY | Facility: CLINIC | Age: 77
End: 2022-04-05
Payer: MEDICARE

## 2022-04-05 NOTE — TELEPHONE ENCOUNTER
I called patient to schedule surgery with Dr. Gurdeep Fischer, I left a voicemail with callback # [phone]

## 2022-04-07 ENCOUNTER — TELEPHONE (OUTPATIENT)
Dept: OPHTHALMOLOGY | Facility: CLINIC | Age: 77
End: 2022-04-07
Payer: MEDICARE

## 2022-04-07 NOTE — TELEPHONE ENCOUNTER
I called patient to schedule surgery with Dr. Gurdeep Fischer, I left a voicemail with callback # [phone]

## 2022-04-07 NOTE — TELEPHONE ENCOUNTER
Spoke with Hayley, she asked that I call her daughter Yeimi to schedule as she will be bringing Hayley to appointments.     Yeimi # [phone]    Hayley gave verbal permission to discuss scheduling with Yeimi 04/07/22 [facility]

## 2022-04-20 ENCOUNTER — TELEPHONE (OUTPATIENT)
Dept: OPHTHALMOLOGY | Facility: CLINIC | Age: 77
End: 2022-04-20
Payer: MEDICARE

## 2022-04-20 DIAGNOSIS — Z11.59 ENCOUNTER FOR SCREENING FOR OTHER VIRAL DISEASES: Primary | ICD-10-CM

## 2022-04-20 PROBLEM — H25.12 SENILE NUCLEAR SCLEROSIS, LEFT: Status: ACTIVE | Noted: 2022-04-20

## 2022-04-20 NOTE — TELEPHONE ENCOUNTER
Patient is scheduled for surgery with Dr. Gurdeep Fischer     Spoke with: Hayley     Date of Surgery: 05/16     Location: [facility]:  [address] [place] [address]     H&P will be completed at: PAC     COVID testing: [facility] lab    Post Op scheduled on 05/17 and 05/25     Surgery packet was mailed to Hayley and daughter, Yeimi at Hayley's request     Additional comments: Advised RN will call 1 - 3 business days prior with arrival time and instructions. Informed patient they will need an adult  and responsible adult to stay with for 24 hours following surgery

## 2022-04-21 NOTE — TELEPHONE ENCOUNTER
FUTURE VISIT INFORMATION      SURGERY INFORMATION:    Date: 22    Location:  or    Surgeon:  Gurdeep Chau MD    Anesthesia Type:  MAC with Regional    Procedure: LEFT EYE PHACOEMULSIFICATION, CATARACT, WITH INTRAOCULAR LENS IMPLANT    RECORDS REQUESTED FROM:       Primary Care Provider: Loraine Galan MD- [facility]    Most recent EKG+ Tracin19- [facility]    Most recent Sleep Study:  10/27/15

## 2022-05-12 ENCOUNTER — OFFICE VISIT (OUTPATIENT)
Dept: SURGERY | Facility: CLINIC | Age: 77
End: 2022-05-12
Payer: MEDICARE

## 2022-05-12 ENCOUNTER — LAB (OUTPATIENT)
Dept: LAB | Facility: CLINIC | Age: 77
End: 2022-05-12
Attending: OPHTHALMOLOGY
Payer: MEDICARE

## 2022-05-12 ENCOUNTER — ANESTHESIA EVENT (OUTPATIENT)
Dept: SURGERY | Facility: AMBULATORY SURGERY CENTER | Age: 77
End: 2022-05-12
Payer: MEDICARE

## 2022-05-12 ENCOUNTER — PRE VISIT (OUTPATIENT)
Dept: SURGERY | Facility: CLINIC | Age: 77
End: 2022-05-12

## 2022-05-12 VITALS
WEIGHT: 212.6 LBS | BODY MASS INDEX: 37.67 KG/M2 | SYSTOLIC BLOOD PRESSURE: 113 MMHG | HEIGHT: 63 IN | OXYGEN SATURATION: 94 % | DIASTOLIC BLOOD PRESSURE: 88 MMHG | HEART RATE: 96 BPM | TEMPERATURE: 98.6 F | RESPIRATION RATE: 16 BRPM

## 2022-05-12 DIAGNOSIS — Z11.59 ENCOUNTER FOR SCREENING FOR OTHER VIRAL DISEASES: ICD-10-CM

## 2022-05-12 DIAGNOSIS — Z01.818 PRE-OP EVALUATION: Primary | ICD-10-CM

## 2022-05-12 PROCEDURE — 99203 OFFICE O/P NEW LOW 30 MIN: CPT | Performed by: NURSE PRACTITIONER

## 2022-05-12 PROCEDURE — U0005 INFEC AGEN DETEC AMPLI PROBE: HCPCS | Performed by: OPHTHALMOLOGY

## 2022-05-12 RX ORDER — FAMOTIDINE 20 MG/1
20 TABLET, FILM COATED ORAL 2 TIMES DAILY
COMMUNITY
Start: 2022-05-11

## 2022-05-12 RX ORDER — VENLAFAXINE HYDROCHLORIDE 150 MG/1
150 CAPSULE, EXTENDED RELEASE ORAL EVERY MORNING
COMMUNITY
Start: 2022-04-22

## 2022-05-12 RX ORDER — MIRTAZAPINE 30 MG/1
30 TABLET, FILM COATED ORAL AT BEDTIME
COMMUNITY
Start: 2021-11-05

## 2022-05-12 RX ORDER — CHLORAL HYDRATE 500 MG
2 CAPSULE ORAL EVERY EVENING
COMMUNITY

## 2022-05-12 RX ORDER — CALCITONIN SALMON 200 [IU]/.09ML
SPRAY, METERED NASAL DAILY
COMMUNITY
Start: 2022-03-26

## 2022-05-12 RX ORDER — MEMANTINE HYDROCHLORIDE 10 MG/1
10 TABLET ORAL 2 TIMES DAILY
COMMUNITY
Start: 2022-03-19

## 2022-05-12 ASSESSMENT — ENCOUNTER SYMPTOMS: ORTHOPNEA: 0

## 2022-05-12 ASSESSMENT — PAIN SCALES - GENERAL: PAINLEVEL: NO PAIN (0)

## 2022-05-12 ASSESSMENT — LIFESTYLE VARIABLES: TOBACCO_USE: 1

## 2022-05-12 NOTE — H&P
Pre-Operative H & P     CC:  Preoperative exam to assess for increased cardiopulmonary risk while undergoing surgery and anesthesia.    Date of Encounter: 5/12/2022  Primary Care Physician:  Jeannette Galan     Reason for visit: Pre-operative evaluation    HPI  Jena Calle is a 77 year old female who presents for pre-operative H & P in preparation for  Procedure Information     Case: [id number] Date/Time: 05/16/22 1135    Procedure: LEFT EYE PHACOEMULSIFICATION, CATARACT, WITH INTRAOCULAR LENS IMPLANT (Left Eye)    Anesthesia type: MAC with Regional    Diagnosis: Senile nuclear sclerosis, left [H25.12]    Pre-op diagnosis: Senile nuclear sclerosis, left [H25.12]    Location: [place] [address] / [facility]    Providers: Gurdeep Chau MD            76-year-old female with PMH of  HTN, PE, thyroid disease, arthritis, JOHANN, GERD, depression and ADD,  dry macular degeneration, senile nuclear sclerosis. S/p strabismus surgery with Dr. Martinez. Gradually worsening vision especially left eye. She has consulted with Dr. Goode and the above procedure has been recommended for treatment.        History is obtained from the patient and chart review    Hx of abnormal bleeding or anti-platelet use: asa     Menstrual history: No LMP recorded (lmp unknown). Patient is postmenopausal.:     Past Medical History  Past Medical History:   Diagnosis Date     Arthritis      Hypothyroid      Macular degeneration      Sleep apnea     uses cpap every night --will bring in        Past Surgical History  Past Surgical History:   Procedure Laterality Date     CATARACT IOL, RT/LT Right 11/2014    RE     LEEP TX, CERVICAL       LEG SURGERY Bilateral     Bilateral, has rods in both legs.     RECESSION RESECTION WITH ADJUSTABLE SUTURE BILATERAL Bilateral 10/10/2016    Procedure: RECESSION RESECTION WITH ADJUSTABLE SUTURE BILATERAL;  Surgeon: Rubio Martinez MD;   [facility]: [facility]     SINUS SURGERY       STRABISMUS SURGERY  10/12/16       Prior to Admission Medications  Current Outpatient Medications   Medication Sig Dispense Refill     amphetamine-dextroamphetamine (ADDERALL XR) 20 MG per capsule Take 20 mg by mouth 3 times daily       ascorbic acid (VITAMIN C) 500 MG CPCR Take 500 mg by mouth daily       aspirin 81 MG tablet Take 81 mg by mouth every evening       calcitonin, salmon, (MIACALCIN) 200 UNIT/ACT nasal spray daily       Cholecalciferol 05397 UNITS CAPS Take by mouth 2 times daily       diltiazem 240 MG 24 hr ER capsule Take 240 mg by mouth every evening       famotidine (PEPCID) 20 MG tablet Take 20 mg by mouth 2 times daily       fish oil-omega-3 fatty acids 1000 MG capsule Take 2 g by mouth every evening       FLUoxetine (PROZAC) 20 MG capsule Take 20 mg by mouth At Bedtime       levothyroxine (SYNTHROID, LEVOTHROID) 125 MCG tablet Take 125 mcg by mouth every morning       memantine (NAMENDA) 10 MG tablet Take 10 mg by mouth 2 times daily       mirtazapine (REMERON) 30 MG tablet Take 30 mg by mouth At Bedtime       mirtazapine (REMERON) 30 MG tablet Take 30 mg by mouth At Bedtime       Multiple Vitamin (MULTIVITAMINS PO) Take by mouth At Bedtime       Multiple Vitamins-Minerals (PRESERVISION AREDS 2 PO) Take 2 capsules by mouth 2 times daily       pantoprazole (PROTONIX) 40 MG enteric coated tablet Take 40 mg by mouth 2 times daily       Acetaminophen (TYLENOL PO) Take by mouth daily (Patient not taking: Reported on 5/12/2022)       ranitidine (ZANTAC) 300 MG tablet Take by mouth At Bedtime (Patient not taking: Reported on 5/12/2022)       venlafaxine (EFFEXOR XR) 150 MG 24 hr capsule Take 150 mg by mouth every morning         Allergies  Allergies   Allergen Reactions     Mesalamine Hives     Sulfa Drugs Hives and Other (See Comments)     Comment: Hives, Description:        Tuberculin Purified Protein Derivative      Other reaction(s): Unknown     Ziprasidone       Other reaction(s): Other, see comments  Diarrhea        Social History  Social History     Socioeconomic History     Marital status:      Spouse name: Not on file     Number of children: Not on file     Years of education: Not on file     Highest education level: Not on file   Occupational History     Not on file   Tobacco Use     Smoking status: Former Smoker     Quit date: 10/6/1951     Years since quittin.6     Smokeless tobacco: Never Used   Substance and Sexual Activity     Alcohol use: Yes     Alcohol/week: 0.0 standard drinks     Comment: rare     Drug use: No     Sexual activity: Not on file   Other Topics Concern     Not on file   Social History Narrative     Not on file     Social Determinants of Health     Financial Resource Strain: Not on file   Food Insecurity: Not on file   Transportation Needs: Not on file   Physical Activity: Not on file   Stress: Not on file   Social Connections: Not on file   Intimate Partner Violence: Not on file   Housing Stability: Not on file       Family History  Family History   Problem Relation Age of Onset     Strabismus Son      Amblyopia Son      Cerebrovascular Disease Brother      Macular Degeneration Mother      Glaucoma No family hx of      Retinal detachment No family hx of        Review of Systems  The complete review of systems is negative other than noted in the HPI or here.   Anesthesia Evaluation            ROS/MED HX  ENT/Pulmonary:  - neg pulmonary ROS   (+) sleep apnea, uses CPAP, tobacco use, Past use,     Neurologic:  - neg neurologic ROS     Cardiovascular:     (+) hypertension----- (-) orthopnea/PND   METS/Exercise Tolerance: 3 - Able to walk 1-2 blocks without stopping Comment: Sedentary lifestyle  METS < 3  Able to take the stairs in her home without issue.   Hematologic: Comments: PE 2/ orthopedic surgery 2013 on warfarin for a short time.    (+) History of blood clots, pt is anticoagulated, history of blood transfusion, no previous  "transfusion reaction,     Musculoskeletal:   (+) arthritis, fracture, Fracture location: RLE and LLE (right & left femerol rodding 2013),     GI/Hepatic:     (+) GERD, Asymptomatic on medication, Inflammatory bowel disease,     Renal/Genitourinary:  - neg Renal ROS     Endo: Comment: Osteoporosis      (+) thyroid problem, hypothyroidism, Obesity,     Psychiatric/Substance Use: Comment: ADD    (+) psychiatric history depression and other (comment)     Infectious Disease:  - neg infectious disease ROS     Malignancy:  - neg malignancy ROS     Other: Comment: Mild memory loss            /88 (BP Location: Right arm, Patient Position: Sitting, Cuff Size: Adult Regular)   Pulse 96   Temp 98.6  F (37  C) (Oral)   Resp 16   Ht 1.6 m (5' 3\")   Wt 96.4 kg (212 lb 9.6 oz)   LMP  (LMP Unknown)   SpO2 94%   Breastfeeding No   BMI 37.66 kg/m      Physical Exam   Constitutional: Awake, alert, cooperative, no apparent distress, and appears stated age.  Eyes: Pupils equal, round and reactive to light, extra ocular muscles intact, sclera clear, conjunctiva normal.  HENT: Normocephalic, oral pharynx with moist mucus membranes, good dentition. No goiter appreciated.   Respiratory: Clear to auscultation bilaterally, no crackles or wheezing.  Cardiovascular: Regular rate and rhythm, normal S1 and S2, and no murmur noted.  Carotids +2, no bruits. No edema. Palpable pulses to radial  DP and PT arteries.   GI: Normal bowel sounds, soft, non-distended, non-tender, no masses palpated, no hepatosplenomegaly.    Lymph/Hematologic: No cervical lymphadenopathy and no supraclavicular lymphadenopathy.  Genitourinary:  deferred  Skin: Warm and dry.  No rashes at anticipated surgical site.   Musculoskeletal: Full ROM of neck. There is no redness, warmth, or swelling of the joints. Gross motor strength is normal.    Neurologic: Awake, alert, oriented to name, place and time. Cranial nerves II-XII are grossly intact. Gait is normal. "   Neuropsychiatric: Calm, cooperative. Normal affect.     Prior Labs/Diagnostic Studies   All labs and imaging personally reviewed     EKG/ stress test - if available please see in ROS above   No results found.  No flowsheet data found.      The patient's records and results personally reviewed by this provider.     Outside records reviewed from: Care Everywhere    LAB/DIAGNOSTIC STUDIES TODAY:    none    Assessment      Jena Calle is a 77 year old female seen as a PAC referral for risk assessment and optimization for anesthesia.    Plan/Recommendations  Pt will be optimized for the proposed procedure.  See below for details on the assessment, risk, and preoperative recommendations    NEUROLOGY  - No history of TIA, CVA or seizure    -Post Op delirium risk factors:  Age    ENT  - No current airway concerns.  Will need to be reassessed day of surgery.  Mallampati: I  TM: > 3    CARDIAC  - No history of CAD and Afib   No anginal symptoms, Denies palpitations, PND, dizziness or syncope.   - Hypertension  Well controlled  - METS (Metabolic Equivalents)  Sedentary lifestyle,   Takes the stairs in her home without issue.   Patient CANNOT perform 4 METS exercise without symptoms            Total Score: 1    Functional Capacity: Unable to complete 4 METS      RCRI-Very low risk: Class 1 0.4% complication rate            Total Score: 0        PULMONARY  - history of PE 2/2 orthopedic surgery in 2013. On warfarin for short term  + Obstructive Sleep Apnea compliant with CPAP      - Denies asthma or inhaler use  - Tobacco History      History   Smoking Status     Former Smoker     Quit date: 10/6/1951   Smokeless Tobacco     Never Used       GI  - GERD  Controlled on medications: Proton Pump Inhibitor  PONV Medium Risk  Total Score: 2           1 AN PONV: Pt is Female    1 AN PONV: Patient is not a current smoker        ENDOCRINE    - BMI: Estimated body mass index is 37.66 kg/m  as calculated from the following:     "Height as of this encounter: 1.6 m (5' 3\").    Weight as of this encounter: 96.4 kg (212 lb 9.6 oz).  Obesity (BMI >30)  - Thyroid disorder  Continue home replacement while hospitalized.    HEME  VTE Low Risk 0.5%            Total Score: 4    VTE: Greater than 59 yrs old    VTE: Pt history of VTE      - No history of abnormal bleeding or antiplatelet use.      MSK  Patient is NOT Frail            Total Score: -        Criteria with incomplete data:    Frailty: Weight loss 10 lbs or greater    Frailty: Slower walking speed    Frailty: Decrease in strength    Frailty: Increased exhaustion    Frailty: Overall lack of energy      - This score is not calculated because of inadequate data         PSYCH  - depression managed on remeron   and ADD ( on adderall will have her hold DOS)      The patient is optimized for their procedure. AVS with information on surgery time/arrival time, meds and NPO status given by nursing staff. No further diagnostic testing indicated.      On the day of service:     Prep time: 15 minutes  Visit time: 15 minutes  Documentation time: 10 minutes  ------------------------------------------  Total time: 40 minutes      DENZEL Hargrove CNP  Preoperative Assessment Center  [facility]  Clinic and Surgery Center  Phone: [phone]  Fax: [phone]  "

## 2022-05-12 NOTE — H&P (VIEW-ONLY)
Pre-Operative H & P     CC:  Preoperative exam to assess for increased cardiopulmonary risk while undergoing surgery and anesthesia.    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 S/p strabismus surgery with Dr. Martinez. Gradually worsening vision especially left eye. She has consulted with Dr. Goode and the above procedure has been recommended for treatment.        History is obtained from the patient and chart review    Hx of abnormal bleeding or anti-platelet use: asa     Menstrual history: No LMP recorded (lmp unknown). Patient is postmenopausal.:     Past Medical History  Past Medical History:   Diagnosis Date     Arthritis      Hypothyroid      Macular degeneration      Sleep apnea     uses cpap every night --will bring in        Past Surgical History  Past Surgical History:   Procedure Laterality Date     CATARACT IOL, RT/LT Right 11/2014    RE     LEEP TX, CERVICAL       LEG SURGERY Bilateral     Bilateral, has rods in both legs.     RECESSION RESECTION WITH ADJUSTABLE SUTURE BILATERAL Bilateral 10/10/2016    Procedure: RECESSION RESECTION WITH ADJUSTABLE SUTURE BILATERAL;  Surgeon: Rubio Martinez MD;   Location: [facility] OR     SINUS SURGERY       STRABISMUS SURGERY  10/12/16       Prior to Admission Medications  Current Outpatient Medications   Medication Sig Dispense Refill     amphetamine-dextroamphetamine (ADDERALL XR) 20 MG per capsule Take 20 mg by mouth 3 times daily       ascorbic acid (VITAMIN C) 500 MG CPCR Take 500 mg by mouth daily       aspirin 81 MG tablet Take 81 mg by mouth every evening       calcitonin, salmon, (MIACALCIN) 200 UNIT/ACT nasal spray daily       Cholecalciferol 11351 UNITS CAPS Take by mouth 2 times daily       diltiazem 240 MG 24 hr ER capsule Take 240 mg by mouth every evening       famotidine (PEPCID) 20 MG tablet Take 20 mg by mouth 2 times daily       fish oil-omega-3 fatty acids 1000 MG capsule Take 2 g by mouth every evening       FLUoxetine (PROZAC) 20 MG capsule Take 20 mg by mouth At Bedtime       levothyroxine (SYNTHROID, LEVOTHROID) 125 MCG tablet Take 125 mcg by mouth every morning       memantine (NAMENDA) 10 MG tablet Take 10 mg by mouth 2 times daily       mirtazapine (REMERON) 30 MG tablet Take 30 mg by mouth At Bedtime       mirtazapine (REMERON) 30 MG tablet Take 30 mg by mouth At Bedtime       Multiple Vitamin (MULTIVITAMINS PO) Take by mouth At Bedtime       Multiple Vitamins-Minerals (PRESERVISION AREDS 2 PO) Take 2 capsules by mouth 2 times daily       pantoprazole (PROTONIX) 40 MG enteric coated tablet Take 40 mg by mouth 2 times daily       Acetaminophen (TYLENOL PO) Take by mouth daily (Patient not taking: Reported on 5/12/2022)       ranitidine (ZANTAC) 300 MG tablet Take by mouth At Bedtime (Patient not taking: Reported on 5/12/2022)       venlafaxine (EFFEXOR XR) 150 MG 24 hr capsule Take 150 mg by mouth every morning         Allergies  Allergies   Allergen Reactions     Mesalamine Hives     Sulfa Drugs Hives and Other (See Comments)     Comment: Hives, Description:        Tuberculin Purified Protein Derivative      Other reaction(s): Unknown     Ziprasidone       Other reaction(s): Other, see comments  Diarrhea        Social History  Social History     Socioeconomic History     Marital status:      Spouse name: Not on file     Number of children: Not on file     Years of education: Not on file     Highest education level: Not on file   Occupational History     Not on file   Tobacco Use     Smoking status: Former Smoker     Quit date: 10/6/1951     Years since quittin.6     Smokeless tobacco: Never Used   Substance and Sexual Activity     Alcohol use: Yes     Alcohol/week: 0.0 standard drinks     Comment: rare     Drug use: No     Sexual activity: Not on file   Other Topics Concern     Not on file   Social History Narrative     Not on file     Social Determinants of Health     Financial Resource Strain: Not on file   Food Insecurity: Not on file   Transportation Needs: Not on file   Physical Activity: Not on file   Stress: Not on file   Social Connections: Not on file   Intimate Partner Violence: Not on file   Housing Stability: Not on file       Family History  Family History   Problem Relation Age of Onset     Strabismus Son      Amblyopia Son      Cerebrovascular Disease Brother      Macular Degeneration Mother      Glaucoma No family hx of      Retinal detachment No family hx of        Review of Systems  The complete review of systems is negative other than noted in the HPI or here.   Anesthesia Evaluation            ROS/MED HX  ENT/Pulmonary:  - neg pulmonary ROS   (+) sleep apnea, uses CPAP, tobacco use, Past use,     Neurologic:  - neg neurologic ROS     Cardiovascular:     (+) hypertension----- (-) orthopnea/PND   METS/Exercise Tolerance: 3 - Able to walk 1-2 blocks without stopping Comment: Sedentary lifestyle  METS < 3  Able to take the stairs in her home without issue.   Hematologic: Comments: PE 2/ orthopedic surgery 2013 on warfarin for a short time.    (+) History of blood clots, pt is anticoagulated, history of blood transfusion, no previous  "transfusion reaction,     Musculoskeletal:   (+) arthritis, fracture, Fracture location: RLE and LLE (right & left femerol rodding 2013),     GI/Hepatic:     (+) GERD, Asymptomatic on medication, Inflammatory bowel disease,     Renal/Genitourinary:  - neg Renal ROS     Endo: Comment: Osteoporosis      (+) thyroid problem, hypothyroidism, Obesity,     Psychiatric/Substance Use: Comment: ADD    (+) psychiatric history depression and other (comment)     Infectious Disease:  - neg infectious disease ROS     Malignancy:  - neg malignancy ROS     Other: Comment: Mild memory loss            /88 (BP Location: Right arm, Patient Position: Sitting, Cuff Size: Adult Regular)   Pulse 96   Temp 98.6  F (37  C) (Oral)   Resp 16   Ht 1.6 m (5' 3\")   Wt 96.4 kg (212 lb 9.6 oz)   LMP  (LMP Unknown)   SpO2 94%   Breastfeeding No   BMI 37.66 kg/m      Physical Exam   Constitutional: Awake, alert, cooperative, no apparent distress, and appears stated age.  Eyes: Pupils equal, round and reactive to light, extra ocular muscles intact, sclera clear, conjunctiva normal.  HENT: Normocephalic, oral pharynx with moist mucus membranes, good dentition. No goiter appreciated.   Respiratory: Clear to auscultation bilaterally, no crackles or wheezing.  Cardiovascular: Regular rate and rhythm, normal S1 and S2, and no murmur noted.  Carotids +2, no bruits. No edema. Palpable pulses to radial  DP and PT arteries.   GI: Normal bowel sounds, soft, non-distended, non-tender, no masses palpated, no hepatosplenomegaly.    Lymph/Hematologic: No cervical lymphadenopathy and no supraclavicular lymphadenopathy.  Genitourinary:  deferred  Skin: Warm and dry.  No rashes at anticipated surgical site.   Musculoskeletal: Full ROM of neck. There is no redness, warmth, or swelling of the joints. Gross motor strength is normal.    Neurologic: Awake, alert, oriented to name, place and time. Cranial nerves II-XII are grossly intact. Gait is normal. "   Neuropsychiatric: Calm, cooperative. Normal affect.     Prior Labs/Diagnostic Studies   All labs and imaging personally reviewed     EKG/ stress test - if available please see in ROS above   No results found.  No flowsheet data found.      The patient's records and results personally reviewed by this provider.     Outside records reviewed from: Care Everywhere    LAB/DIAGNOSTIC STUDIES TODAY:    none    Assessment      Jena Calle is a 77 year old female seen as a PAC referral for risk assessment and optimization for anesthesia.    Plan/Recommendations  Pt will be optimized for the proposed procedure.  See below for details on the assessment, risk, and preoperative recommendations    NEUROLOGY  - No history of TIA, CVA or seizure    -Post Op delirium risk factors:  Age    ENT  - No current airway concerns.  Will need to be reassessed day of surgery.  Mallampati: I  TM: > 3    CARDIAC  - No history of CAD and Afib   No anginal symptoms, Denies palpitations, PND, dizziness or syncope.   - Hypertension  Well controlled  - METS (Metabolic Equivalents)  Sedentary lifestyle,   Takes the stairs in her home without issue.   Patient CANNOT perform 4 METS exercise without symptoms            Total Score: 1    Functional Capacity: Unable to complete 4 METS      RCRI-Very low risk: Class 1 0.4% complication rate            Total Score: 0        PULMONARY  - history of PE 2/2 orthopedic surgery in 2013. On warfarin for short term  + Obstructive Sleep Apnea compliant with CPAP      - Denies asthma or inhaler use  - Tobacco History      History   Smoking Status     Former Smoker     Quit date: 10/6/1951   Smokeless Tobacco     Never Used       GI  - GERD  Controlled on medications: Proton Pump Inhibitor  PONV Medium Risk  Total Score: 2           1 AN PONV: Pt is Female    1 AN PONV: Patient is not a current smoker        ENDOCRINE    - BMI: Estimated body mass index is 37.66 kg/m  as calculated from the following:     "Height as of this encounter: 1.6 m (5' 3\").    Weight as of this encounter: 96.4 kg (212 lb 9.6 oz).  Obesity (BMI >30)  - Thyroid disorder  Continue home replacement while hospitalized.    HEME  VTE Low Risk 0.5%            Total Score: 4    VTE: Greater than 59 yrs old    VTE: Pt history of VTE      - No history of abnormal bleeding or antiplatelet use.      MSK  Patient is NOT Frail            Total Score: -        Criteria with incomplete data:    Frailty: Weight loss 10 lbs or greater    Frailty: Slower walking speed    Frailty: Decrease in strength    Frailty: Increased exhaustion    Frailty: Overall lack of energy      - This score is not calculated because of inadequate data         PSYCH  - depression managed on remeron   and ADD ( on adderall will have her hold DOS)      The patient is optimized for their procedure. AVS with information on surgery time/arrival time, meds and NPO status given by nursing staff. No further diagnostic testing indicated.      On the day of service:     Prep time: 15 minutes  Visit time: 15 minutes  Documentation time: 10 minutes  ------------------------------------------  Total time: 40 minutes      DENZEL Hargrove CNP  Preoperative Assessment Center  [facility]  Clinic and Surgery Center  Phone: [phone]  Fax: [phone]  "

## 2022-05-12 NOTE — PATIENT INSTRUCTIONS
Preparing for Your Surgery      Name:  Jena Calle   MRN:  [id number]   :  1945   Today's Date:  2022         Arriving for surgery:  Surgery date:  22  Arrival time:  10:05AM    Restrictions due to COVID 19:    Effective 2022  1 visitor may accompany patient and wait in the surgery waiting room  All visitors must wear a mask and social distance      parking is available for anyone with mobility limitations or disabilities. (Monday- Friday 7 am- 5 pm)    Please come to:    [facility]  [address] [place] [id number]    Please check in on the 5th floor at the Ambulatory Surgery Center       What can I eat or drink?    -  You may eat and drink normally until 8 hours before surgery. (Until 22, 3:35AM)  -  You may have clear liquids up to 4 hours before surgery. (Until 22, 7:35AM)  Examples of clear liquids:  Water  Clear broth  Juices (apple, white grape, white cranberry  and cider) without pulp  Noncarbonated, powder based beverages  (lemonade and Ramin-Aid)  Sodas (Sprite, 7-Up, ginger ale and seltzer)  Coffee or tea (without milk or cream)  Gatorade    --No alcohol for at least 24 hours before surgery    Which medicines can I take?    Hold Aspirin for 7 days before surgery.   Hold Multivitamins for 7 days before surgery.  Hold Supplements for 7 days before surgery.  Hold Ibuprofen (Advil, Motrin) for 1 day before surgery--unless otherwise directed by surgeon.  Hold Naproxen (Aleve) for 4 days before surgery.    -  DO NOT take the following medications the day of surgery:    Adderall   Vitamin C    Vitamin D       -  PLEASE TAKE the following medications the day of surgery     Levothyroxine   Pantoprazole(Protonix)    How do I prepare myself?  - Please take 2 showers before surgery using Scrubcare or Hibiclens soap.    Use this soap only from the neck to your toes.     Leave the soap on your skin for one minute--then rinse  thoroughly.      You may use your own shampoo and conditioner; no other hair products.   - Please remove all jewelry and body piercings.  - No lotions, deodorants or fragrance.  - No makeup or fingernail polish.   - Bring your ID and insurance card.    -If you have a Deep Brain Stimulator, a Spinal Cord Stimulator or any implanted Neuro Device you must bring the remote to the Surgery Center         - All patients are required to have a Covid-19 test within 4 days of surgery/procedure.      -Patients will be contacted by the [facility] scheduling team within 1 week of surgery to make an appointment.      - Patients may call the Scheduling team at [phone] if they have not been scheduled within 4 days of  surgery.      ALL PATIENTS ARE REQUIRED TO HAVE A RESPONSIBLE ADULT TO DRIVE AND BE IN ATTENDANCE WITH THEM FOR 24 HOURS FOLLOWING SURGERY       Questions or Concerns:    -For questions regarding the day of surgery please contact the Ambulatory Surgery Center at [phone].    -If you have health changes between today and your surgery please contact your surgeon.     For questions after surgery please call your surgeons office.

## 2022-05-13 LAB — SARS-COV-2 RNA RESP QL NAA+PROBE: NEGATIVE

## 2022-05-16 ENCOUNTER — ANESTHESIA (OUTPATIENT)
Dept: SURGERY | Facility: AMBULATORY SURGERY CENTER | Age: 77
End: 2022-05-16
Payer: MEDICARE

## 2022-05-16 ENCOUNTER — HOSPITAL ENCOUNTER (OUTPATIENT)
Facility: AMBULATORY SURGERY CENTER | Age: 77
Discharge: HOME | End: 2022-05-16
Attending: OPHTHALMOLOGY
Payer: MEDICARE

## 2022-05-16 VITALS
OXYGEN SATURATION: 92 % | TEMPERATURE: 98.8 F | DIASTOLIC BLOOD PRESSURE: 59 MMHG | HEART RATE: 85 BPM | HEIGHT: 63 IN | BODY MASS INDEX: 37.56 KG/M2 | SYSTOLIC BLOOD PRESSURE: 124 MMHG | RESPIRATION RATE: 14 BRPM | WEIGHT: 212 LBS

## 2022-05-16 DIAGNOSIS — H25.12 SENILE NUCLEAR SCLEROSIS, LEFT: Primary | ICD-10-CM

## 2022-05-16 PROCEDURE — 66984 XCAPSL CTRC RMVL W/O ECP: CPT | Mod: LT | Performed by: OPHTHALMOLOGY

## 2022-05-16 PROCEDURE — 66984 XCAPSL CTRC RMVL W/O ECP: CPT | Mod: LT

## 2022-05-16 DEVICE — EYE IMP IOL ALCON ACRYSOF UV SA60WF 19.5D: Type: IMPLANTABLE DEVICE | Site: EYE | Status: FUNCTIONAL

## 2022-05-16 RX ORDER — MOXIFLOXACIN 5 MG/ML
1 SOLUTION/ DROPS OPHTHALMIC 3 TIMES DAILY
Qty: 3 ML | Refills: 0 | Status: SHIPPED | OUTPATIENT
Start: 2022-05-16

## 2022-05-16 RX ORDER — SODIUM CHLORIDE, SODIUM LACTATE, POTASSIUM CHLORIDE, CALCIUM CHLORIDE 600; 310; 30; 20 MG/100ML; MG/100ML; MG/100ML; MG/100ML
INJECTION, SOLUTION INTRAVENOUS CONTINUOUS
Status: DISCONTINUED | OUTPATIENT
Start: 2022-05-16 | End: 2022-05-17 | Stop reason: HOSPADM

## 2022-05-16 RX ORDER — MOXIFLOXACIN IN NACL,ISO-OS/PF 0.3MG/0.3
SYRINGE (ML) INTRAOCULAR PRN
Status: DISCONTINUED | OUTPATIENT
Start: 2022-05-16 | End: 2022-05-16 | Stop reason: HOSPADM

## 2022-05-16 RX ORDER — ONDANSETRON 2 MG/ML
4 INJECTION INTRAMUSCULAR; INTRAVENOUS EVERY 30 MIN PRN
Status: DISCONTINUED | OUTPATIENT
Start: 2022-05-16 | End: 2022-05-17 | Stop reason: HOSPADM

## 2022-05-16 RX ORDER — FENTANYL CITRATE 50 UG/ML
INJECTION, SOLUTION INTRAMUSCULAR; INTRAVENOUS PRN
Status: DISCONTINUED | OUTPATIENT
Start: 2022-05-16 | End: 2022-05-16

## 2022-05-16 RX ORDER — LIDOCAINE 40 MG/G
CREAM TOPICAL
Status: DISCONTINUED | OUTPATIENT
Start: 2022-05-16 | End: 2022-05-16 | Stop reason: HOSPADM

## 2022-05-16 RX ORDER — ACETAMINOPHEN 325 MG/1
975 TABLET ORAL ONCE
Status: COMPLETED | OUTPATIENT
Start: 2022-05-16 | End: 2022-05-16

## 2022-05-16 RX ORDER — SODIUM CHLORIDE, SODIUM LACTATE, POTASSIUM CHLORIDE, CALCIUM CHLORIDE 600; 310; 30; 20 MG/100ML; MG/100ML; MG/100ML; MG/100ML
INJECTION, SOLUTION INTRAVENOUS CONTINUOUS
Status: DISCONTINUED | OUTPATIENT
Start: 2022-05-16 | End: 2022-05-16 | Stop reason: HOSPADM

## 2022-05-16 RX ORDER — PROPARACAINE HYDROCHLORIDE 5 MG/ML
1 SOLUTION/ DROPS OPHTHALMIC ONCE
Status: COMPLETED | OUTPATIENT
Start: 2022-05-16 | End: 2022-05-16

## 2022-05-16 RX ORDER — CYCLOPENTOLAT/TROPIC/PHENYLEPH 1%-1%-2.5%
1 DROPS (EA) OPHTHALMIC (EYE)
Status: COMPLETED | OUTPATIENT
Start: 2022-05-16 | End: 2022-05-16

## 2022-05-16 RX ORDER — MOXIFLOXACIN 5 MG/ML
1 SOLUTION/ DROPS OPHTHALMIC
Status: COMPLETED | OUTPATIENT
Start: 2022-05-16 | End: 2022-05-16

## 2022-05-16 RX ORDER — FENTANYL CITRATE 50 UG/ML
25 INJECTION, SOLUTION INTRAMUSCULAR; INTRAVENOUS
Status: DISCONTINUED | OUTPATIENT
Start: 2022-05-16 | End: 2022-05-17 | Stop reason: HOSPADM

## 2022-05-16 RX ORDER — TETRACAINE HYDROCHLORIDE 5 MG/ML
SOLUTION OPHTHALMIC PRN
Status: DISCONTINUED | OUTPATIENT
Start: 2022-05-16 | End: 2022-05-16 | Stop reason: HOSPADM

## 2022-05-16 RX ORDER — FENTANYL CITRATE 50 UG/ML
25 INJECTION, SOLUTION INTRAMUSCULAR; INTRAVENOUS EVERY 5 MIN PRN
Status: DISCONTINUED | OUTPATIENT
Start: 2022-05-16 | End: 2022-05-16 | Stop reason: HOSPADM

## 2022-05-16 RX ORDER — PREDNISOLONE ACETATE 10 MG/ML
1 SUSPENSION/ DROPS OPHTHALMIC 4 TIMES DAILY
Qty: 5 ML | Refills: 0 | Status: SHIPPED | OUTPATIENT
Start: 2022-05-16

## 2022-05-16 RX ORDER — ONDANSETRON 4 MG/1
4 TABLET, ORALLY DISINTEGRATING ORAL EVERY 30 MIN PRN
Status: DISCONTINUED | OUTPATIENT
Start: 2022-05-16 | End: 2022-05-17 | Stop reason: HOSPADM

## 2022-05-16 RX ORDER — BALANCED SALT SOLUTION 6.4; .75; .48; .3; 3.9; 1.7 MG/ML; MG/ML; MG/ML; MG/ML; MG/ML; MG/ML
SOLUTION OPHTHALMIC PRN
Status: DISCONTINUED | OUTPATIENT
Start: 2022-05-16 | End: 2022-05-16 | Stop reason: HOSPADM

## 2022-05-16 RX ADMIN — MOXIFLOXACIN 1 DROP: 5 SOLUTION/ DROPS OPHTHALMIC at 10:23

## 2022-05-16 RX ADMIN — Medication 1 DROP: at 10:18

## 2022-05-16 RX ADMIN — PROPARACAINE HYDROCHLORIDE 1 DROP: 5 SOLUTION/ DROPS OPHTHALMIC at 10:12

## 2022-05-16 RX ADMIN — FENTANYL CITRATE 50 MCG: 50 INJECTION, SOLUTION INTRAMUSCULAR; INTRAVENOUS at 11:47

## 2022-05-16 RX ADMIN — FENTANYL CITRATE 50 MCG: 50 INJECTION, SOLUTION INTRAMUSCULAR; INTRAVENOUS at 11:54

## 2022-05-16 RX ADMIN — Medication 1 DROP: at 10:14

## 2022-05-16 RX ADMIN — SODIUM CHLORIDE, SODIUM LACTATE, POTASSIUM CHLORIDE, CALCIUM CHLORIDE: 600; 310; 30; 20 INJECTION, SOLUTION INTRAVENOUS at 10:24

## 2022-05-16 RX ADMIN — MOXIFLOXACIN 1 DROP: 5 SOLUTION/ DROPS OPHTHALMIC at 10:13

## 2022-05-16 RX ADMIN — ACETAMINOPHEN 975 MG: 325 TABLET ORAL at 10:15

## 2022-05-16 RX ADMIN — Medication 1 DROP: at 10:23

## 2022-05-16 RX ADMIN — MOXIFLOXACIN 1 DROP: 5 SOLUTION/ DROPS OPHTHALMIC at 10:18

## 2022-05-16 NOTE — ANESTHESIA CARE TRANSFER NOTE
Patient: Jena Calle    Procedure: Procedure(s):  LEFT EYE PHACOEMULSIFICATION, CATARACT, WITH INTRAOCULAR LENS IMPLANT       Diagnosis: Senile nuclear sclerosis, left [H25.12]  Diagnosis Additional Information: No value filed.    Anesthesia Type:   MAC     Note:    Oropharynx: oropharynx clear of all foreign objects and spontaneously breathing  Level of Consciousness: awake  Oxygen Supplementation: room air    Independent Airway: airway patency satisfactory and stable  Dentition: dentition unchanged  Vital Signs Stable: post-procedure vital signs reviewed and stable  Report to RN Given: handoff report given  Patient transferred to: Phase II    Handoff Report: Identifed the Patient, Identified the Reponsible Provider, Reviewed the pertinent medical history, Discussed the surgical course, Reviewed Intra-OP anesthesia mangement and issues during anesthesia, Set expectations for post-procedure period and Allowed opportunity for questions and acknowledgement of understanding      Vitals:  Vitals Value Taken Time   BP     Temp     Pulse     Resp     SpO2         Electronically Signed By: DENZEL Avendano CRNA  May 16, 2022  12:22 PM  
Pt admitted for further care & management.

## 2022-05-16 NOTE — OP NOTE
SURGEON:  Gurdeep Goode MD  Assistant surgeon: Frantz Pineda MD  PREOPERATIVE DIAGNOSIS:   1. visually significant cataract Left eye  POSTOPERATIVE DIAGNOSIS: same  NAME OF THE PROCEDURE: Phacoemulsification with intraocular lens implantation, SA60WF 19.5 D     ANESTHESIA: Monitored anesthesia care  COMPLICATIONS: none  INDICATIONS: Jena Calle is a 77 year old with diagnosis of visually significant cataract, here for cataract surgery    DESCRIPTION OF THE PROCEDURE:  The patient was taken to the operative room where intravenous sedation was administered.   The operative eye was prepped and draped in the usual sterile surgical fashion for ophthalmic surgery, including the installation of one drop of 5% Povidone Iodine. A sterile drape was placed over the face and body and a lid speculum was inserted.      With the use of a Supersharp blade and 0.12 forceps, a paracentesis was created inferiorly, and pf lidocaine followed by viscoelastic was injected into the anterior chamber using a canula.  A 2.5 mm keratome was then used to construct a clear corneal incision temporally.  Using Utrata forceps and cystotome needle, a continuous curvilinear capsulorrhexis was created and hydrodissection was undertaken with the use of BSS.  The nucleus was found to be freely mobile and then removed by phacoemulsification using a stop and chop technique.  The remaining elements of cortex were then removed with irrigation/aspiration.  An IOL,was injected into the capsular bag and was rotated into a good position with a Sinskey hook. The remaining elements of viscoelastic were then removed with irrigation/aspiration. 100mcg of vigamox was injected intracameral. The wounds were hydrodissect and were watertight.    The lid speculum was removed.  he eye was cleaned with wet and dry gauze.  A clear shield were placed over the eye.  The patient was discharge in stable condition having tolerated the procedure well    Implant Name  Type Inv. Item Serial No.  Lot No. LRB No. Used Action   EYE IMP IOL RAYO ACRYSOF UV SA60WF 19.5D - [id number] Lens/Eye Implant EYE IMP IOL RAYO ACRYSOF UV SA60WF 19.5D [id number] [employer]  Left 1 Implanted

## 2022-05-16 NOTE — DISCHARGE INSTRUCTIONS
If you have obstructive sleep apnea     You were given anesthesia medicine during surgery to keep you comfortable and free of pain. After surgery, you may have more apnea spells because of this medicine and other medicines you were given. The spells may last longer than usual.     At home:        Keep using the continuous positive airway pressure (CPAP) device when you sleep. Unless your health care provider tells you not to, use it when you sleep, day or night. CPAP is a common device used to treat obstructive sleep apnea.    [facility]  Home Care Following Anesthesia  For 24 hours after surgery:  Get plenty of rest.  A responsible adult must stay with you for at least 24 hours after you leave the surgery center.  Do not drive or use heavy equipment.  If you have weakness or tingling, don't drive or use heavy equipment until this feeling goes away.   Do not drink alcohol.   Avoid strenuous or risky activities.  Ask for help when climbing stairs.  You may feel lightheaded.  IF so, sit for a few minutes before standing.  Have someone help you get up.   If you have nausea (feel sick to your stomach): Drink only clear liquids such as apple juice, ginger ale, broth or 7-Up.  Rest may also help.  Be sure to drink enough fluids.  Move to a regular diet as you feel able.   You may have a slight fever.  Call the doctor if your fever is over 100 F (37.7 C) (taken under the tongue) or lasts longer than 24 hours.  You may have a dry mouth, a sore throat, muscle aches or trouble sleeping. These should go away after 24 hours.  Do not make important or legal decisions.   It is recommended to avoid smoking.               Tips for taking pain medications  To get the best pain relief possible, remember these points:  Take pain medications as directed, before pain becomes severe.  Pain medication can upset your stomach: taking it with food may help.  Constipation is a common side effect of pain  medication. Drink plenty of  fluids.  Eat foods high in fiber. Take a stool softener if recommended by your doctor or pharmacist.  Do not drink alcohol, drive or operate machinery while taking pain medications.  Ask about other ways to control pain, such as with heat, ice or relaxation.    Tylenol/Acetaminophen Consumption  To help encourage the safe use of acetaminophen, the makers of TYLENOL  have lowered the maximum daily dose for single-ingredient Extra Strength TYLENOL  (acetaminophen) products sold in the [place] from 8 pills per day (4,000 mg) to 6 pills per day (3,000 mg). The dosing interval has also changed from 2 pills every 4-6 hours to 2 pills every 6 hours.  If you feel your pain relief is insufficient, you may take Tylenol/Acetaminophen in addition to your narcotic pain medication.   Be careful not to exceed 3,000 mg of Tylenol/Acetaminophen in a 24 hour period from all sources.  If you are taking extra strength Tylenol/acetaminophen (500 mg), the maximum dose is 6 tablets in 24 hours.  If you are taking regular strength acetaminophen (325 mg), the maximum dose is 9 tablets in 24 hours.    You last took Tylenol 975mg at 10:15am. Next availablse dose is at 4:15pm, then follow bottle instructions.    Call a doctor for any of the following:  Signs of infection (fever, growing tenderness at the surgery site, a large amount of drainage or bleeding, severe pain, foul-smelling drainage, redness, swelling).  It has been over 8 to 10 hours since surgery and you are still not able to urinate (pass water).  Headache for over 24 hours.  Numbness, tingling or weakness the day after surgery (if you had spinal anesthesia).  Signs of Covid-19 infection (temperature over 100 degrees, shortness of breath, cough, loss of taste/smell, generalized body aches, persistent headache, chills, sore throat, nausea/vomiting/diarrhea)  Your doctor is:       Dr. Gurdeep Fischer, Ophthalmology: [phone]               Or  dial [phone] and ask for the resident on call for:  Ophthalmology  For emergency care, call the:  [facility]:  [phone] (TTY for hearing impaired: [phone])

## 2022-05-16 NOTE — ANESTHESIA POSTPROCEDURE EVALUATION
Patient: Jena Calle    Procedure: Procedure(s):  LEFT EYE PHACOEMULSIFICATION, CATARACT, WITH INTRAOCULAR LENS IMPLANT       Anesthesia Type:  MAC    Note:  Disposition: Outpatient   Postop Pain Control: Uneventful            Sign Out: Well controlled pain   PONV: No   Neuro/Psych: Uneventful            Sign Out: Acceptable/Baseline neuro status   Airway/Respiratory: Uneventful            Sign Out: Acceptable/Baseline resp. status   CV/Hemodynamics: Uneventful            Sign Out: Acceptable CV status; No obvious hypovolemia; No obvious fluid overload   Other NRE: NONE   DID A NON-ROUTINE EVENT OCCUR? No           Last vitals:  Vitals Value Taken Time   /59 05/16/22 1239   Temp 37.1  C (98.8  F) 05/16/22 1222   Pulse 85 05/16/22 1239   Resp 14 05/16/22 1239   SpO2 92 % 05/16/22 1239       Electronically Signed By: Andrew Acevedo MD, MD  May 16, 2022  1:02 PM

## 2022-05-16 NOTE — ANESTHESIA PREPROCEDURE EVALUATION
Anesthesia Pre-Procedure Evaluation    Patient: Jena Calle   MRN: [id number] : 1945        Procedure : Procedure(s):  LEFT EYE PHACOEMULSIFICATION, CATARACT, WITH INTRAOCULAR LENS IMPLANT          Past Medical History:   Diagnosis Date     Arthritis      Hypothyroid      Macular degeneration      Sleep apnea     uses cpap every night --will bring in       Past Surgical History:   Procedure Laterality Date     CATARACT IOL, RT/LT Right 2014    RE     LEEP TX, CERVICAL       LEG SURGERY Bilateral     Bilateral, has rods in both legs.     RECESSION RESECTION WITH ADJUSTABLE SUTURE BILATERAL Bilateral 10/10/2016    Procedure: RECESSION RESECTION WITH ADJUSTABLE SUTURE BILATERAL;  Surgeon: Rubio Martinez MD;  Location: [facility] OR     SINUS SURGERY       STRABISMUS SURGERY  10/12/16      Allergies   Allergen Reactions     Mesalamine Hives     Sulfa Drugs Hives and Other (See Comments)     Comment: Hives, Description:        Tuberculin Purified Protein Derivative      Other reaction(s): Unknown     Ziprasidone      Other reaction(s): Other, see comments  Diarrhea       Social History     Tobacco Use     Smoking status: Former Smoker     Quit date: 10/6/1951     Years since quittin.6     Smokeless tobacco: Never Used   Substance Use Topics     Alcohol use: Yes     Alcohol/week: 0.0 standard drinks     Comment: rare      Wt Readings from Last 1 Encounters:   22 96.2 kg (212 lb)        Anesthesia Evaluation   Pt has had prior anesthetic.     History of anesthetic complications  - PONV.      ROS/MED HX  ENT/Pulmonary:     (+) sleep apnea, mild, doesn't use CPAP,     Neurologic:  - neg neurologic ROS     Cardiovascular:  - neg cardiovascular ROS     METS/Exercise Tolerance:     Hematologic:       Musculoskeletal:   (+) arthritis,     GI/Hepatic:  - neg GI/hepatic ROS     Renal/Genitourinary:       Endo:     (+) thyroid problem, hypothyroidism,     Psychiatric/Substance Use:  - neg  psychiatric ROS     Infectious Disease:       Malignancy:       Other:            Physical Exam    Airway  airway exam normal           Respiratory Devices and Support         Dental  no notable dental history         Cardiovascular   cardiovascular exam normal          Pulmonary   pulmonary exam normal                OUTSIDE LABS:  CBC: No results found for: WBC, HGB, HCT, PLT  BMP: No results found for: NA, POTASSIUM, CHLORIDE, CO2, BUN, CR, GLC  COAGS: No results found for: PTT, INR, FIBR  POC: No results found for: BGM, HCG, HCGS  HEPATIC: No results found for: ALBUMIN, PROTTOTAL, ALT, AST, GGT, ALKPHOS, BILITOTAL, BILIDIRECT, URMILA  OTHER: No results found for: PH, LACT, A1C, OUMAR, PHOS, MAG, LIPASE, AMYLASE, TSH, T4, T3, CRP, SED    Anesthesia Plan    ASA Status:  2   NPO Status:  NPO Appropriate    Anesthesia Type: MAC.     - Reason for MAC: straight local not clinically adequate   Induction: Intravenous.   Maintenance: TIVA.        Consents    Anesthesia Plan(s) and associated risks, benefits, and realistic alternatives discussed. Questions answered and patient/representative(s) expressed understanding.     - Discussed: Risks, Benefits and Alternatives for BOTH SEDATION and the PROCEDURE were discussed     - Discussed with:  Patient         Postoperative Care    Pain management: Oral pain medications.   PONV prophylaxis: Ondansetron (or other 5HT-3), Dexamethasone or Solumedrol     Comments:                Andrew Acevedo MD, MD

## 2022-05-25 ENCOUNTER — OFFICE VISIT (OUTPATIENT)
Dept: OPHTHALMOLOGY | Facility: CLINIC | Age: 77
End: 2022-05-25
Attending: OPHTHALMOLOGY
Payer: MEDICARE

## 2022-05-25 DIAGNOSIS — H35.3132 INTERMEDIATE STAGE NONEXUDATIVE AGE-RELATED MACULAR DEGENERATION OF BOTH EYES: ICD-10-CM

## 2022-05-25 DIAGNOSIS — Z48.810 AFTERCARE FOLLOWING SURGERY OF A SENSE ORGAN: Primary | ICD-10-CM

## 2022-05-25 PROCEDURE — G0463 HOSPITAL OUTPT CLINIC VISIT: HCPCS

## 2022-05-25 PROCEDURE — 99024 POSTOP FOLLOW-UP VISIT: CPT | Performed by: OPHTHALMOLOGY

## 2022-05-25 PROCEDURE — 92134 CPTRZ OPH DX IMG PST SGM RTA: CPT | Performed by: OPHTHALMOLOGY

## 2022-05-25 ASSESSMENT — VISUAL ACUITY
OD_SC: 20/80
OS_PH_SC+: -1
METHOD: SNELLEN - LINEAR
OS_SC+: SEARCHING
OS_PH_SC: 20/80
OS_SC: 20/100

## 2022-05-25 ASSESSMENT — TONOMETRY
IOP_METHOD: TONOPEN
OS_IOP_MMHG: 15
OD_IOP_MMHG: 11

## 2022-05-25 ASSESSMENT — EXTERNAL EXAM - LEFT EYE: OS_EXAM: NORMAL

## 2022-05-25 ASSESSMENT — CONF VISUAL FIELD
OS_NORMAL: 1
METHOD: COUNTING FINGERS
OD_NORMAL: 1

## 2022-05-25 ASSESSMENT — SLIT LAMP EXAM - LIDS: COMMENTS: MGD

## 2022-05-25 ASSESSMENT — CUP TO DISC RATIO: OS_RATIO: 0.5

## 2022-05-25 ASSESSMENT — EXTERNAL EXAM - RIGHT EYE: OD_EXAM: NORMAL

## 2022-05-25 NOTE — NURSING NOTE
Chief Complaints and History of Present Illnesses   Patient presents with     Post Op (Ophthalmology) Left Eye     Chief Complaint(s) and History of Present Illness(es)     Post Op (Ophthalmology) Left Eye     Laterality: left eye    Frequency: constantly    Course: rapidly improving    Associated symptoms: Negative for eye pain, redness, flashes, floaters and discharge    Pain scale: 0/10              Comments     POW #1 for Phaco/IOL of LE.  Pt states much improved VA / denies LE discomfort / instilling Vigamox TID to LE and Prednisolone four times a day to LE without any problems.  Ava Hayes, COT COT 9:56 AM 05/25/2022                  .

## 2022-05-25 NOTE — PATIENT INSTRUCTIONS
Stop moxifloxacin drop    Decrease prednisolone (white cap) to 1 drop 3 times per day for one week, then 1 drop 2 times per day for one week then one drop once per day for one week then stop

## 2022-05-25 NOTE — PROGRESS NOTES
CC: S/P CE IOL left eye 5/16/22    HPI: Doing ok since surgery, vision is better and stable. Using drops as instructed, no eye pain.     76 year old female here for f/u dry macular degeneration.  S/p strabismus surgery with Dr. Martinez.    POHx:  CE/IOL OD 11/2014  CE/IOL left eye 5/16/2022 ([facility])  S/p YAG OD     Amsler Grid:  No metamorphopsia OD or left eye    OCT Macula 5-25-22  OD: diffuse reticular pseudodrusen, stable central vitelliform deposit with flattening of foveal contour, no IRF/SRF; stable   OS: diffuse reticular pseudodrusen, central vitelliform deposit is smaller than previous with flattening of foveal contour, no IRF/SRF; stable       Assessment and Plan    # Pseudophakia, OS   - VA 20/80, IOP ok. Vision likely limited by outer retinal and RPE changes   - Trace PCO   - Stop moxifloxacin   - Taper Prednisolone 3/2/1 q week   - follow up in 1 mo for low vision refraction with Dr. Calle and 3 m with me     # Dry Macular Degeneration, OU   - OCT - many small - intermediate drusen and pseudo-drusen both eyes.    - Stable OU, associated with sub-foveal vitelliform deposit both eyes    - AREDS Category 1/2    - c/w AREDS II supplements      - Low Vision Refraction with Dr. Calle    #  Adult vitelliform Dystrophy   - repeat fundus autofluorescence and OCT macula both eyes shows stable lesions   - may have a major contribution to the amount of vision; discussed    # Pseudophakia, OD   - S/p YAG, looks good    # History of XT s/p strabismus repair with Dr. Martinez    - doing well    # eyelid margin lesion   - gelatinous appearing small lesion with abnormal vessels    - refer to oculo plastic     RTC for 1 month with Dr. Calle for MRx and in 3-4 months with me for DFE and OCT     Ann Mraie Bond MD  Ophthalmology Resident, PGY-3  [facility]       I spent 55 minutes examining the patient, discussing the findings and treatment plan, explaining the risk, benefits, and alternatives of  surgery, and documenting my findings.  Complete documentation of historical and exam elements from today's encounter can be found in the full encounter summary report (not reduplicated in this progress note). I personally obtained the chief complaint(s) and history of present illness.  I confirmed and edited as necessary the review of systems, past medical/surgical history, family history, social history, and examination findings as documented by others; and I examined the patient myself. I personally reviewed the relevant tests, images, and reports as documented above. I formulated and edited as necessary the assessment and plan and discussed the findings and management plan with the patient and family.    Gurdeep Goode MD, PhD

## 2022-06-23 ENCOUNTER — OFFICE VISIT (OUTPATIENT)
Dept: OPTOMETRY | Facility: CLINIC | Age: 77
End: 2022-06-23
Payer: MEDICARE

## 2022-06-23 DIAGNOSIS — H52.4 PRESBYOPIA: ICD-10-CM

## 2022-06-23 DIAGNOSIS — H35.3134 ADVANCED ATROPHIC NONEXUDATIVE AGE-RELATED MACULAR DEGENERATION OF BOTH EYES WITH SUBFOVEAL INVOLVEMENT: Primary | ICD-10-CM

## 2022-06-23 ASSESSMENT — EXTERNAL EXAM - RIGHT EYE: OD_EXAM: NORMAL

## 2022-06-23 ASSESSMENT — SLIT LAMP EXAM - LIDS: COMMENTS: MGD

## 2022-06-23 ASSESSMENT — REFRACTION_MANIFEST
OD_AXIS: 170
OS_SPHERE: -2.75
OD_SPHERE: -1.75
OD_CYLINDER: +1.00
OS_AXIS: 035
OD_ADD: +3.50
OS_ADD: +3.50
OS_CYLINDER: +1.50

## 2022-06-23 ASSESSMENT — REFRACTION_WEARINGRX
OD_CYLINDER: +1.00
OD_SPHERE: -1.75
SPECS_TYPE: PAL
OS_ADD: +3.50
OD_ADD: +3.50
OD_AXIS: 170
OS_AXIS: 035
OS_SPHERE: -0.75
OS_CYLINDER: +1.50

## 2022-06-23 ASSESSMENT — VISUAL ACUITY
OD_CC: 20/50
OS_CC: 20/150-
METHOD: SNELLEN - LINEAR
CORRECTION_TYPE: GLASSES
OD_CC+: -2

## 2022-06-23 ASSESSMENT — EXTERNAL EXAM - LEFT EYE: OS_EXAM: NORMAL

## 2022-06-23 ASSESSMENT — CONF VISUAL FIELD
METHOD: COUNTING FINGERS
OS_NORMAL: 1
OD_NORMAL: 1

## 2022-06-23 NOTE — PROGRESS NOTES
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 DMV form filled out restricting patient to 55mph. Recommend following up annually to monitor for changes. Advised patient that changes to best corrected acuity may also change her driving recommendations.     (H52.4) Presbyopia  Plan: REFRACTION [[id number]]        Discussed findings with patient. New spectacle prescription dispensed to patient. Patient is welcome to return to clinic with prolonged adaptation difficulties. Ok to continue with PAL as needed. Can convert to reading only Rx if requested.           45 minutes were spent on the date of the encounter doing chart review, history and exam, documentation, and further activities as noted above.    Complete documentation of historical and exam elements from today's encounter can  be found in the full encounter summary report (not reduplicated in this progress  note). I personally obtained the chief complaint(s) and history of present illness. I  confirmed and edited as necessary the review of systems, past medical/surgical  history, family history, social history, and examination findings as documented by  others; and I examined the patient myself. I personally reviewed the relevant tests,  images, and reports as documented above. I formulated and edited as necessary the  assessment and plan and discussed the findings and management plan with the  patient and family.    Richard Calle, OD

## 2022-08-17 DIAGNOSIS — H35.3132 INTERMEDIATE STAGE NONEXUDATIVE AGE-RELATED MACULAR DEGENERATION OF BOTH EYES: Primary | ICD-10-CM

## 2022-09-08 ENCOUNTER — LAB REQUISITION (OUTPATIENT)
Dept: LAB | Facility: CLINIC | Age: 77
End: 2022-09-08
Payer: MEDICARE

## 2022-09-08 DIAGNOSIS — I10 ESSENTIAL (PRIMARY) HYPERTENSION: ICD-10-CM

## 2022-09-09 LAB
ERYTHROCYTE [DISTWIDTH] IN BLOOD BY AUTOMATED COUNT: 14.5 % (ref 10–15)
HCT VFR BLD AUTO: 41 % (ref 35–47)
HGB BLD-MCNC: 13 G/DL (ref 11.7–15.7)
MCH RBC QN AUTO: 31.3 PG (ref 26.5–33)
MCHC RBC AUTO-ENTMCNC: 31.7 G/DL (ref 31.5–36.5)
MCV RBC AUTO: 99 FL (ref 78–100)
PLATELET # BLD AUTO: 203 10E3/UL (ref 150–450)
RBC # BLD AUTO: 4.16 10E6/UL (ref 3.8–5.2)
WBC # BLD AUTO: 12.2 10E3/UL (ref 4–11)

## 2022-09-09 PROCEDURE — P9603 ONE-WAY ALLOW PRORATED MILES: HCPCS | Performed by: NURSE PRACTITIONER

## 2022-09-09 PROCEDURE — 36415 COLL VENOUS BLD VENIPUNCTURE: CPT | Performed by: NURSE PRACTITIONER

## 2022-09-09 PROCEDURE — 85027 COMPLETE CBC AUTOMATED: CPT | Performed by: NURSE PRACTITIONER

## 2022-09-12 LAB
ERYTHROCYTE [DISTWIDTH] IN BLOOD BY AUTOMATED COUNT: 14.6 % (ref 10–15)
HCT VFR BLD AUTO: 42.2 % (ref 35–47)
HGB BLD-MCNC: 13.4 G/DL (ref 11.7–15.7)
MCH RBC QN AUTO: 30.8 PG (ref 26.5–33)
MCHC RBC AUTO-ENTMCNC: 31.8 G/DL (ref 31.5–36.5)
MCV RBC AUTO: 97 FL (ref 78–100)
PLATELET # BLD AUTO: 228 10E3/UL (ref 150–450)
RBC # BLD AUTO: 4.35 10E6/UL (ref 3.8–5.2)
WBC # BLD AUTO: 11.9 10E3/UL (ref 4–11)

## 2022-09-12 PROCEDURE — P9604 ONE-WAY ALLOW PRORATED TRIP: HCPCS | Performed by: NURSE PRACTITIONER

## 2022-09-12 PROCEDURE — 85014 HEMATOCRIT: CPT | Performed by: NURSE PRACTITIONER

## 2022-09-12 PROCEDURE — 36415 COLL VENOUS BLD VENIPUNCTURE: CPT | Performed by: NURSE PRACTITIONER

## 2022-10-16 ENCOUNTER — HEALTH MAINTENANCE LETTER (OUTPATIENT)
Age: 77
End: 2022-10-16

## 2023-03-26 ENCOUNTER — HEALTH MAINTENANCE LETTER (OUTPATIENT)
Age: 78
End: 2023-03-26

## 2023-12-06 DIAGNOSIS — H35.3132 INTERMEDIATE STAGE NONEXUDATIVE AGE-RELATED MACULAR DEGENERATION OF BOTH EYES: Primary | ICD-10-CM

## 2023-12-14 ENCOUNTER — OFFICE VISIT (OUTPATIENT)
Dept: OPHTHALMOLOGY | Facility: CLINIC | Age: 78
End: 2023-12-14
Attending: OPHTHALMOLOGY
Payer: MEDICARE

## 2023-12-14 DIAGNOSIS — Z96.1 PSEUDOPHAKIA OF BOTH EYES: ICD-10-CM

## 2023-12-14 DIAGNOSIS — H35.54 ADULT ONSET VITELLIFORM MACULAR DYSTROPHY: ICD-10-CM

## 2023-12-14 DIAGNOSIS — H35.3132 INTERMEDIATE STAGE NONEXUDATIVE AGE-RELATED MACULAR DEGENERATION OF BOTH EYES: Primary | ICD-10-CM

## 2023-12-14 PROCEDURE — 99214 OFFICE O/P EST MOD 30 MIN: CPT | Performed by: OPHTHALMOLOGY

## 2023-12-14 PROCEDURE — 92250 FUNDUS PHOTOGRAPHY W/I&R: CPT | Performed by: OPHTHALMOLOGY

## 2023-12-14 PROCEDURE — G0463 HOSPITAL OUTPT CLINIC VISIT: HCPCS | Performed by: OPHTHALMOLOGY

## 2023-12-14 PROCEDURE — 92134 CPTRZ OPH DX IMG PST SGM RTA: CPT | Performed by: OPHTHALMOLOGY

## 2023-12-14 PROCEDURE — 99207 FUNDUS AUTOFLUORESCENCE IMAGE (FAF) OU (BOTH EYES): CPT | Mod: 26 | Performed by: OPHTHALMOLOGY

## 2023-12-14 ASSESSMENT — CONF VISUAL FIELD
OS_SUPERIOR_NASAL_RESTRICTION: 3
METHOD: COUNTING FINGERS
OD_NORMAL: 1
OD_SUPERIOR_TEMPORAL_RESTRICTION: 0
OD_INFERIOR_NASAL_RESTRICTION: 0
OD_INFERIOR_TEMPORAL_RESTRICTION: 0
OD_SUPERIOR_NASAL_RESTRICTION: 0

## 2023-12-14 ASSESSMENT — REFRACTION_WEARINGRX
OD_AXIS: 170
OD_CYLINDER: +1.00
OS_ADD: +3.25
OS_AXIS: 031
SPECS_TYPE: PAL
OS_CYLINDER: +1.50
OD_ADD: +3.25
OS_SPHERE: -2.50
OD_SPHERE: -1.50

## 2023-12-14 ASSESSMENT — SLIT LAMP EXAM - LIDS: COMMENTS: MGD

## 2023-12-14 ASSESSMENT — TONOMETRY
OS_IOP_MMHG: 15
OD_IOP_MMHG: 17
IOP_METHOD: TONOPEN

## 2023-12-14 ASSESSMENT — VISUAL ACUITY
OS_CC: 20/100
OS_CC+: +1
OD_CC: 20/70
CORRECTION_TYPE: GLASSES
METHOD: SNELLEN - LINEAR

## 2023-12-14 ASSESSMENT — CUP TO DISC RATIO
OD_RATIO: 0.4
OS_RATIO: 0.5

## 2023-12-14 ASSESSMENT — EXTERNAL EXAM - RIGHT EYE: OD_EXAM: NORMAL

## 2023-12-14 ASSESSMENT — EXTERNAL EXAM - LEFT EYE: OS_EXAM: NORMAL

## 2023-12-14 NOTE — PROGRESS NOTES
I have confirmed the patient's and reviewed Past Medical History, Past Surgical History, Social History, Family History, Problem List, Medication List and agree with Tech note.     CC: mild blurry vision both eyes      HPI: Subjective harder to see to read      78 year old female here for f/u dry macular degeneration.  S/p strabismus surgery with Dr. Martinez.  Vision seems stable since last visit here, maybe slightly worse since her strab surgery, but diplopia resolved     POHx:  CE/IOL OD 11/2014  S/p YAG OD      Amsler Grid:  No metamorphopsia OD or OS        Assessment and Plan     1. Dry Macular Degeneration, OU              - OCT today- many small - intermediate drusen both eyes, no intraretinal or SRF, however pseudo-drusen both eyes noted.               - Stable OU, there is a sub-foveal deposit both eyes               - FAF 02//21 and today:  Regular right eye and hyper-autofluorescence OS and pseudodrusen ou noted and wonder if this is adult vitelliform both eyes.  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 Calle      2.  Adult vitelliform Dystrophy              - repeat fundus autofluorescence both eyes one year   - central geographic atrophy, good candidate reading study      2. Pseudophakia, OU              - S/p YAG, looks good        3. History of XT s/p strabismus repair with Dr. Martinez               - doing well     Retina 3 months with Low Vision Refraction with Dr. Calle     One year or as needed with retina exam and  OCT ou and fundus autofluorescence both eyes               Jocelyn Hayden MD PhD.  Professor & Chair

## 2023-12-14 NOTE — NURSING NOTE
Chief Complaints and History of Present Illnesses   Patient presents with    Follow Up     Intermediate stage nonexudative age-related macular degeneration of both eyes     Chief Complaint(s) and History of Present Illness(es)       Follow Up              Comments: Intermediate stage nonexudative age-related macular degeneration of both eyes              Comments    Pt C/o distortion in her vision. Lines look wavy   C/o some watering and itching both eye   States eye feel dry and are painful at the end of the day 3/10 on the pain scale    Vianca Lynch COT 10:19 AM December 14, 2023

## 2024-01-10 ENCOUNTER — OFFICE VISIT (OUTPATIENT)
Dept: OPHTHALMOLOGY | Facility: CLINIC | Age: 79
End: 2024-01-10
Payer: MEDICARE

## 2024-01-10 DIAGNOSIS — H52.4 PRESBYOPIA: ICD-10-CM

## 2024-01-10 DIAGNOSIS — H35.3132 INTERMEDIATE STAGE NONEXUDATIVE AGE-RELATED MACULAR DEGENERATION OF BOTH EYES: Primary | ICD-10-CM

## 2024-01-10 DIAGNOSIS — H35.54 ADULT ONSET VITELLIFORM MACULAR DYSTROPHY: ICD-10-CM

## 2024-01-10 PROCEDURE — 92015 DETERMINE REFRACTIVE STATE: CPT | Mod: GY | Performed by: OPTOMETRIST

## 2024-01-10 PROCEDURE — 99215 OFFICE O/P EST HI 40 MIN: CPT | Performed by: OPTOMETRIST

## 2024-01-10 ASSESSMENT — CONF VISUAL FIELD
OS_SUPERIOR_NASAL_RESTRICTION: 0
OS_INFERIOR_NASAL_RESTRICTION: 0
OD_SUPERIOR_NASAL_RESTRICTION: 0
OD_SUPERIOR_TEMPORAL_RESTRICTION: 0
OD_INFERIOR_TEMPORAL_RESTRICTION: 0
OD_INFERIOR_NASAL_RESTRICTION: 0
OS_NORMAL: 1
OD_NORMAL: 1
OS_INFERIOR_TEMPORAL_RESTRICTION: 0
OS_SUPERIOR_TEMPORAL_RESTRICTION: 0

## 2024-01-10 ASSESSMENT — REFRACTION_MANIFEST
OS_SPHERE: -2.00
OD_SPHERE: -1.75
OD_ADD: +3.50
OD_CYLINDER: +1.75
OS_AXIS: 030
OD_AXIS: 170
OS_ADD: +3.50
OS_CYLINDER: +1.50

## 2024-01-10 ASSESSMENT — SLIT LAMP EXAM - LIDS: COMMENTS: MGD

## 2024-01-10 ASSESSMENT — REFRACTION_WEARINGRX
SPECS_TYPE: PAL
OS_ADD: +3.25
OS_CYLINDER: +1.50
OD_SPHERE: -1.50
OD_ADD: +3.25
OS_SPHERE: -2.50
OD_CYLINDER: +1.00
OS_AXIS: 031
OD_AXIS: 170

## 2024-01-10 ASSESSMENT — VISUAL ACUITY
OD_CC: 20/60
OD_CC+: -2
METHOD: SNELLEN - LINEAR
OS_CC: 20/125
CORRECTION_TYPE: GLASSES

## 2024-01-10 ASSESSMENT — EXTERNAL EXAM - RIGHT EYE: OD_EXAM: NORMAL

## 2024-01-10 ASSESSMENT — TONOMETRY
OS_IOP_MMHG: 13
IOP_METHOD: ICARE
OD_IOP_MMHG: 15

## 2024-01-10 ASSESSMENT — EXTERNAL EXAM - LEFT EYE: OS_EXAM: NORMAL

## 2024-01-10 NOTE — NURSING NOTE
Chief Complaints and History of Present Illnesses   Patient presents with    Follow Up     1.5 yr follow up Low vision refraction     Chief Complaint(s) and History of Present Illness(es)       Follow Up              Associated symptoms: dryness and eye pain.  Negative for flashes and floaters    Treatments tried: no treatments    Comments: 1.5 yr follow up Low vision refraction              Comments    Pt states her distance and near vision has gotten worse.  Sometimes she has to use a magnifying glass to read books, and small print.   Occasional sharp right eye pain about once per month.   Dryness both eyes, has not been using gtts because of recalls in the news.     Rudolph Cassidy 10:34 AM January 10, 2024

## 2024-01-10 NOTE — PROGRESS NOTES
Assessment/Plan  (H35.5860) Intermediate stage nonexudative age-related macular degeneration of both eyes  (primary encounter diagnosis)  Comment: Patient follows with retina clinic. Best corrected vision 20/40+, OS: 20/60-  Plan: Rx updated for patient with subtle changes. Slight improvement was noted in office. Advised patient that she should qualify for a [place] 's license with either her current or new glasses. Patient will likely return for [facility] Reading Study.     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     (H52.4) Presbyopia  Plan: REFRACTION [[id number]]        Discussed findings with patient. New spectacle prescription dispensed to patient. Patient is welcome to return to clinic with prolonged adaptation difficulties.           40 minutes were spent on the date of the encounter doing chart review, history and exam, documentation, and further activities as noted above.    Complete documentation of historical and exam elements from today's encounter can  be found in the full encounter summary report (not reduplicated in this progress  note). I personally obtained the chief complaint(s) and history of present illness. I  confirmed and edited as necessary the review of systems, past medical/surgical  history, family history, social history, and examination findings as documented by  others; and I examined the patient myself. I personally reviewed the relevant tests,  images, and reports as documented above. I formulated and edited as necessary the  assessment and plan and discussed the findings and management plan with the  patient and family.    Richard Calle OD

## 2024-05-13 ENCOUNTER — TELEPHONE (OUTPATIENT)
Dept: OPHTHALMOLOGY | Facility: CLINIC | Age: 79
End: 2024-05-13
Payer: MEDICARE

## 2024-05-13 NOTE — TELEPHONE ENCOUNTER
[facility] Call Center    Phone Message    May a detailed message be left on voicemail: yes     Reason for Call: Other: Pt states she sent a certified letter on 4/18 to the clinic with some DMV paperwork that needed to be completed, but she has not received it back yet and is wondering if it was ever received. Please call pt to advise. Thank you.     Action Taken: Message routed to:  [facility] EYE    Travel Screening: Not Applicable

## 2024-06-01 ENCOUNTER — HEALTH MAINTENANCE LETTER (OUTPATIENT)
Age: 79
End: 2024-06-01

## 2024-07-19 ENCOUNTER — TELEPHONE (OUTPATIENT)
Dept: OPHTHALMOLOGY | Facility: CLINIC | Age: 79
End: 2024-07-19
Payer: MEDICARE

## 2024-07-24 ENCOUNTER — OFFICE VISIT (OUTPATIENT)
Dept: OPHTHALMOLOGY | Facility: CLINIC | Age: 79
End: 2024-07-24
Payer: MEDICARE

## 2024-07-24 DIAGNOSIS — H52.4 PRESBYOPIA: ICD-10-CM

## 2024-07-24 DIAGNOSIS — H35.3132 INTERMEDIATE STAGE NONEXUDATIVE AGE-RELATED MACULAR DEGENERATION OF BOTH EYES: Primary | ICD-10-CM

## 2024-07-24 PROCEDURE — 99214 OFFICE O/P EST MOD 30 MIN: CPT | Performed by: OPTOMETRIST

## 2024-07-24 ASSESSMENT — REFRACTION_WEARINGRX
OS_CYLINDER: +1.50
OD_CYLINDER: +1.75
OS_AXIS: 030
OD_SPHERE: -1.75
OD_AXIS: 170
OS_SPHERE: -2.00
SPECS_TYPE: LAST MRX IN PHOROPTER
OS_ADD: +3.50
OD_ADD: +3.50

## 2024-07-24 ASSESSMENT — VISUAL ACUITY
OD_CC: 20/40
METHOD: SNELLEN - LINEAR
OS_CC+: -2+1
OD_CC+: -2+1
CORRECTION_TYPE: GLASSES
OS_CC: 20/70

## 2024-07-24 ASSESSMENT — EXTERNAL EXAM - RIGHT EYE: OD_EXAM: NORMAL

## 2024-07-24 ASSESSMENT — CONF VISUAL FIELD
METHOD: COUNTING FINGERS
OD_INFERIOR_TEMPORAL_RESTRICTION: 0
OD_SUPERIOR_TEMPORAL_RESTRICTION: 0
OS_SUPERIOR_NASAL_RESTRICTION: 0
OS_NORMAL: 1
OD_NORMAL: 1
OD_INFERIOR_NASAL_RESTRICTION: 0
OS_INFERIOR_NASAL_RESTRICTION: 0
OS_INFERIOR_TEMPORAL_RESTRICTION: 0
OD_SUPERIOR_NASAL_RESTRICTION: 0
OS_SUPERIOR_TEMPORAL_RESTRICTION: 0

## 2024-07-24 ASSESSMENT — SLIT LAMP EXAM - LIDS: COMMENTS: MGD

## 2024-07-24 ASSESSMENT — TONOMETRY
OD_IOP_MMHG: 14
OS_IOP_MMHG: 15
IOP_METHOD: ICARE

## 2024-07-24 ASSESSMENT — EXTERNAL EXAM - LEFT EYE: OS_EXAM: NORMAL

## 2024-07-24 NOTE — NURSING NOTE
Chief Complaints and History of Present Illnesses   Patient presents with    Follow Up     Eye exam for drivers license     Chief Complaint(s) and History of Present Illness(es)       Follow Up              Laterality: both eyes    Course: stable    Associated symptoms: Negative for eye pain    Pain scale: 0/10    Comments: Eye exam for drivers license              Comments    Pt notes she sent in her last exam from January via mail, it got lost, etc, she found out that the exam has to be within 90 days so she is here to verify nothing has changed and get a new [place] form [place] out.     Marlee Alegria COT July 24, 2024 9:41 AM

## 2024-07-24 NOTE — PROGRESS NOTES
Assessment/Plan  (H35.5789) Intermediate stage nonexudative age-related macular degeneration of both eyes  (primary encounter diagnosis)  Comment: Patient follows with retina clinic. Overall stable vision today. DMV paperwork filled out.    Plan: Paperwork filled out for [place] '[place] license. Patient is past due for retina follow up, so recommend that she schedule that appointment today. Return to clinic with any sudden vision changes.     (H52.4) Presbyopia  Plan: See above note. Rx was updated 6 months ago so refraction was not repeated today.           30 minutes were spent on the date of the encounter doing chart review, history and exam, documentation, and further activities as noted above.    Complete documentation of historical and exam elements from today's encounter can  be found in the full encounter summary report (not reduplicated in this progress  note). I personally obtained the chief complaint(s) and history of present illness. I  confirmed and edited as necessary the review of systems, past medical/surgical  history, family history, social history, and examination findings as documented by  others; and I examined the patient myself. I personally reviewed the relevant tests,  images, and reports as documented above. I formulated and edited as necessary the  assessment and plan and discussed the findings and management plan with the  patient and family.    Richard Calle OD

## 2024-07-30 DIAGNOSIS — H35.3132 INTERMEDIATE STAGE NONEXUDATIVE AGE-RELATED MACULAR DEGENERATION OF BOTH EYES: Primary | ICD-10-CM

## 2024-10-17 DIAGNOSIS — H35.3132 INTERMEDIATE STAGE NONEXUDATIVE AGE-RELATED MACULAR DEGENERATION OF BOTH EYES: Primary | ICD-10-CM

## 2024-10-31 ENCOUNTER — OFFICE VISIT (OUTPATIENT)
Dept: OPHTHALMOLOGY | Facility: CLINIC | Age: 79
End: 2024-10-31
Payer: MEDICARE

## 2024-10-31 DIAGNOSIS — H04.123 DRY EYES, BILATERAL: ICD-10-CM

## 2024-10-31 DIAGNOSIS — H35.3132 INTERMEDIATE STAGE NONEXUDATIVE AGE-RELATED MACULAR DEGENERATION OF BOTH EYES: ICD-10-CM

## 2024-10-31 DIAGNOSIS — H35.3124 ADVANCED ATROPHIC NONEXUDATIVE AGE-RELATED MACULAR DEGENERATION OF LEFT EYE WITH SUBFOVEAL INVOLVEMENT: Primary | ICD-10-CM

## 2024-10-31 PROCEDURE — 99214 OFFICE O/P EST MOD 30 MIN: CPT | Mod: GC

## 2024-10-31 PROCEDURE — 92250 FUNDUS PHOTOGRAPHY W/I&R: CPT

## 2024-10-31 PROCEDURE — 99207 FUNDUS AUTOFLUORESCENCE IMAGE (FAF) OU (BOTH EYES): CPT | Mod: 26

## 2024-10-31 PROCEDURE — 92134 CPTRZ OPH DX IMG PST SGM RTA: CPT

## 2024-10-31 PROCEDURE — G0463 HOSPITAL OUTPT CLINIC VISIT: HCPCS

## 2024-10-31 ASSESSMENT — VISUAL ACUITY
OS_CC: 20/500
OD_CC: 20/60
METHOD: SNELLEN - LINEAR
CORRECTION_TYPE: GLASSES

## 2024-10-31 ASSESSMENT — SLIT LAMP EXAM - LIDS: COMMENTS: MGD

## 2024-10-31 ASSESSMENT — CUP TO DISC RATIO
OS_RATIO: 0.5
OD_RATIO: 0.4

## 2024-10-31 ASSESSMENT — REFRACTION_WEARINGRX
OS_AXIS: 031
OD_ADD: +3.25
OS_SPHERE: -2.50
OD_SPHERE: -1.50
OS_ADD: +3.25
OD_AXIS: 170
OS_CYLINDER: +1.50
OD_CYLINDER: +1.00

## 2024-10-31 ASSESSMENT — CONF VISUAL FIELD
OS_SUPERIOR_NASAL_RESTRICTION: 0
OD_SUPERIOR_NASAL_RESTRICTION: 0
OD_NORMAL: 1
OD_INFERIOR_NASAL_RESTRICTION: 0
METHOD: COUNTING FINGERS
OS_NORMAL: 1
OS_INFERIOR_NASAL_RESTRICTION: 0
OD_INFERIOR_TEMPORAL_RESTRICTION: 0
OS_INFERIOR_TEMPORAL_RESTRICTION: 0
OD_SUPERIOR_TEMPORAL_RESTRICTION: 0
OS_SUPERIOR_TEMPORAL_RESTRICTION: 0

## 2024-10-31 ASSESSMENT — EXTERNAL EXAM - RIGHT EYE: OD_EXAM: NORMAL

## 2024-10-31 ASSESSMENT — TONOMETRY
OS_IOP_MMHG: 14
OD_IOP_MMHG: 14
IOP_METHOD: TONOPEN

## 2024-10-31 ASSESSMENT — EXTERNAL EXAM - LEFT EYE: OS_EXAM: NORMAL

## 2024-10-31 NOTE — NURSING NOTE
"Chief Complaints and History of Present Illnesses   Patient presents with    Macular Degeneration Evaluation     1 year follow up for Dry AMD each eye.     Patient notes her vision has some distortion. \"When I am watching television, the person's right eye looks distorted.\" Patient notes it's harder to distinguish numbers when looking at smaller print. Patient notes sometimes her left eye will hurt a little. Patient states she uses Artificial tears as needed.    Radha Chun, COT 1:31 PM 10/31/2024       Chief Complaint(s) and History of Present Illness(es)       Macular Degeneration Evaluation              Laterality: both eyes    Onset: years ago    Quality: blurred vision and distorted vision    Course: gradually worsening    Associated symptoms: dryness.  Negative for eye pain, flashes and floaters    Treatments tried: artificial tears and glasses    Pain scale: 0/10    Comments: 1 year follow up for Dry AMD each eye.     Patient notes her vision has some distortion. \"When I am watching television, the person's right eye looks distorted.\" Patient notes it's harder to distinguish numbers when looking at smaller print. Patient notes sometimes her left eye will hurt a little. Patient states she uses Artificial tears as needed.    Radha Chun, COT 1:31 PM 10/31/2024                      "

## 2024-10-31 NOTE — PROCEDURES
"CC: Follow up Macular Degeneration     HPI: 79 year old female with history of non exudative macular degeneration and adult onset vitelliform dystrophy who presents for retina follow up. Previous patient of [facility].     She has noticed her vision has worsened in the past 6 months, right worse than left. The right eye has \"distortions\".     Has also had strabismus surgery for ET in 2016. She denies binocular diplopia.     She has an amsler grid on her refrigerator but does not use it. She uses AREDS II BID.     PMHx:   Osteoporosis   HTN  Hypothyroidism  GERD       Imaging:    OCT: 10/31/2024  Right eye: subfoveal RPE/Outer retinal atrophy, reticular pseudodrusen, leptochoroid   Left eye: subfoveal RPE/Outer retinal atrophy, reticular pseudodrusen, leptochoroid    FAF   OD: progression of hypoautofluorescence temporal macula compared to last scan  OS: progression of hypoautofluorescence nasal macula compared to last scan    Retina Laser procedures:  None    Intravitreal injections:  None     Assessment/ Plan: 10/31/2024       # Severe stage dry AMD OU   - leafy green vegetables  - smoking avoidance   - Amsler grid  - ?Syvofre consideration OS   - AREDS II for OD     # Pseudophakia OU   - observe    # History of strabismus  - good alignment no diplopia     Follow up in 6-12 months DFE and OCT OU     Brandan Berger, PGY3     Ileana Rae MD     Medical Retina  [facility]     Attending Physician Attestation:  Complete documentation of historical and exam elements from today's encounter can be found in the full encounter summary report (not reduplicated in this progress note).  I personally obtained the chief complaint(s) and history of present illness.  I confirmed and edited as necessary the review of systems, past medical/surgical history, family history, social history, and examination findings as documented by others; and I examined the patient myself.  I personally reviewed the " relevant tests, images, and reports as documented above.  I formulated and edited as necessary the assessment and plan and discussed the findings and management plan with the patient and family. Ileana Rae MD

## 2024-10-31 NOTE — PROGRESS NOTES
"CC: Follow up Macular Degeneration     HPI: 79 year old female with history of non exudative macular degeneration and adult onset vitelliform dystrophy who presents for retina follow up. Previous patient of [facility].     She has noticed her vision has worsened in the past 6 months, right worse than left. The right eye has \"distortions\".     Has also had strabismus surgery for ET in 2016. She denies binocular diplopia.     She has an amsler grid on her refrigerator but does not use it. She uses AREDS II BID.     PMHx:   Osteoporosis   HTN  Hypothyroidism  GERD       Imaging:    OCT: 10/31/2024  Right eye: subfoveal RPE/Outer retinal atrophy, reticular pseudodrusen, leptochoroid   Left eye: subfoveal RPE/Outer retinal atrophy, reticular pseudodrusen, leptochoroid    FAF   OD: progression of hypoautofluorescence temporal macula compared to last scan  OS: progression of hypoautofluorescence nasal macula compared to last scan    Retina Laser procedures:  None    Intravitreal injections:  None     Assessment/ Plan: 10/31/2024       # Dry AMD OU with GA both eyes   - leafy green vegetables  - smoking avoidance   - Amsler grid  - AREDS II for OD     # Pseudophakia OU   - observe    # History of strabismus  - good alignment no diplopia     #Dry eye syndrome both eyes   Recommend starting preservative free artificial tears both eyes     Follow up in 6-7 months DFE and OCT OU     Brandan Berger, PGY3     Ileana Rae MD     Medical Retina  [facility]     Attending Physician Attestation:  Complete documentation of historical and exam elements from today's encounter can be found in the full encounter summary report (not reduplicated in this progress note).  I personally obtained the chief complaint(s) and history of present illness.  I confirmed and edited as necessary the review of systems, past medical/surgical history, family history, social history, and examination findings as documented by " others; and I examined the patient myself.  I personally reviewed the relevant tests, images, and reports as documented above.  I formulated and edited as necessary the assessment and plan and discussed the findings and management plan with the patient and family. Ileana Rae MD

## (undated) DEVICE — EYE NDL RETROBULBAR ATKINSON 25GA 1.5" 581637

## (undated) DEVICE — EYE KNIFE STILETTO VISITEC 1.1MM ANG 45DEG SIDEPORT 376620

## (undated) DEVICE — EYE PACK CUSTOM ANTERIOR 30DEG TIP CENTURION PPK6682-04

## (undated) DEVICE — SOL WATER IRRIG 500ML BOTTLE 2F7113

## (undated) DEVICE — EYE KNIFE SLIT XSTAR VISITEC 2.5MM 45DEG BEVEL UP 373725

## (undated) DEVICE — EYE DRSG PAD OVAL

## (undated) DEVICE — TAPE MICROPORE 1"X1.5YD 1530S-1

## (undated) DEVICE — EYE CANN IRR 25GA CYSTOTOME 581610

## (undated) DEVICE — GLOVE PROTEXIS MICRO 7.0  2D73PM70

## (undated) DEVICE — EYE TIP IRRIGATION & ASPIRATION POLYMER 35D BENT 8065751511

## (undated) DEVICE — PACK CATARACT CUSTOM ASC SEY15CPUMC

## (undated) DEVICE — EYE SHIELD PLASTIC

## (undated) DEVICE — SU NYLON 10-0 DA 1" AA-1824

## (undated) RX ORDER — FENTANYL CITRATE 50 UG/ML
INJECTION, SOLUTION INTRAMUSCULAR; INTRAVENOUS
Status: DISPENSED
Start: 2022-05-16

## (undated) RX ORDER — ACETAMINOPHEN 325 MG/1
TABLET ORAL
Status: DISPENSED
Start: 2022-05-16